# Patient Record
Sex: FEMALE | Race: BLACK OR AFRICAN AMERICAN | NOT HISPANIC OR LATINO | Employment: UNEMPLOYED | URBAN - METROPOLITAN AREA
[De-identification: names, ages, dates, MRNs, and addresses within clinical notes are randomized per-mention and may not be internally consistent; named-entity substitution may affect disease eponyms.]

---
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
## 2020-04-07 DIAGNOSIS — N92.0 MENORRHAGIA WITH REGULAR CYCLE: ICD-10-CM

## 2020-04-07 DIAGNOSIS — N94.6 DYSMENORRHEA: ICD-10-CM

## 2020-04-07 RX ORDER — NORETHINDRONE ACETATE AND ETHINYL ESTRADIOL AND FERROUS FUMARATE 1MG-20(21)
KIT ORAL
Qty: 84 TABLET | Refills: 1 | Status: SHIPPED | OUTPATIENT
Start: 2020-04-07 | End: 2020-09-29

## 2020-09-21 DIAGNOSIS — N92.0 MENORRHAGIA WITH REGULAR CYCLE: ICD-10-CM

## 2020-09-21 DIAGNOSIS — N94.6 DYSMENORRHEA: ICD-10-CM

## 2020-09-30 RX ORDER — NORETHINDRONE ACETATE AND ETHINYL ESTRADIOL AND FERROUS FUMARATE 1MG-20(21)
KIT ORAL
Qty: 84 TABLET | Refills: 0 | Status: SHIPPED | OUTPATIENT
Start: 2020-09-30

## 2023-12-31 ENCOUNTER — HOSPITAL ENCOUNTER (INPATIENT)
Facility: HOSPITAL | Age: 23
LOS: 8 days | Discharge: HOME | End: 2024-01-08
Attending: EMERGENCY MEDICINE | Admitting: STUDENT IN AN ORGANIZED HEALTH CARE EDUCATION/TRAINING PROGRAM
Payer: COMMERCIAL

## 2023-12-31 DIAGNOSIS — Z3A.31 31 WEEKS GESTATION OF PREGNANCY: ICD-10-CM

## 2023-12-31 DIAGNOSIS — O14.90 PREECLAMPSIA: Primary | ICD-10-CM

## 2023-12-31 DIAGNOSIS — O14.13 PREECLAMPSIA, SEVERE, THIRD TRIMESTER: ICD-10-CM

## 2023-12-31 PROBLEM — N94.6 DYSMENORRHEA: Status: RESOLVED | Noted: 2019-05-02 | Resolved: 2023-12-31

## 2023-12-31 LAB
ABO GROUP BLD: NORMAL
ABO GROUP BLD: NORMAL
ALBUMIN SERPL BCP-MCNC: 3.5 G/DL (ref 3.5–5)
ALP SERPL-CCNC: 231 U/L (ref 34–104)
ALT SERPL W P-5'-P-CCNC: 14 U/L (ref 7–52)
ANION GAP SERPL CALCULATED.3IONS-SCNC: 9 MMOL/L
APTT PPP: 29 SECONDS (ref 23–37)
AST SERPL W P-5'-P-CCNC: 20 U/L (ref 13–39)
BACTERIA UR QL AUTO: ABNORMAL /HPF
BASOPHILS # BLD AUTO: 0.03 THOUSANDS/ÂΜL (ref 0–0.1)
BASOPHILS NFR BLD AUTO: 0 % (ref 0–1)
BILIRUB SERPL-MCNC: 0.22 MG/DL (ref 0.2–1)
BILIRUB UR QL STRIP: NEGATIVE
BLD GP AB SCN SERPL QL: NEGATIVE
BUN SERPL-MCNC: 7 MG/DL (ref 5–25)
CALCIUM SERPL-MCNC: 8.7 MG/DL (ref 8.4–10.2)
CHLORIDE SERPL-SCNC: 108 MMOL/L (ref 96–108)
CLARITY UR: CLEAR
CO2 SERPL-SCNC: 20 MMOL/L (ref 21–32)
COLOR UR: ABNORMAL
CREAT SERPL-MCNC: 0.8 MG/DL (ref 0.6–1.3)
CREAT UR-MCNC: 60 MG/DL
EOSINOPHIL # BLD AUTO: 0.12 THOUSAND/ÂΜL (ref 0–0.61)
EOSINOPHIL NFR BLD AUTO: 2 % (ref 0–6)
ERYTHROCYTE [DISTWIDTH] IN BLOOD BY AUTOMATED COUNT: 14.9 % (ref 11.6–15.1)
GFR SERPL CREATININE-BSD FRML MDRD: 104 ML/MIN/1.73SQ M
GLUCOSE SERPL-MCNC: 71 MG/DL (ref 65–140)
GLUCOSE UR STRIP-MCNC: NEGATIVE MG/DL
HCT VFR BLD AUTO: 33.1 % (ref 34.8–46.1)
HGB BLD-MCNC: 10.5 G/DL (ref 11.5–15.4)
HGB UR QL STRIP.AUTO: NEGATIVE
IMM GRANULOCYTES # BLD AUTO: 0.03 THOUSAND/UL (ref 0–0.2)
IMM GRANULOCYTES NFR BLD AUTO: 0 % (ref 0–2)
INR PPP: 0.95 (ref 0.84–1.19)
KETONES UR STRIP-MCNC: NEGATIVE MG/DL
LEUKOCYTE ESTERASE UR QL STRIP: NEGATIVE
LYMPHOCYTES # BLD AUTO: 1.46 THOUSANDS/ÂΜL (ref 0.6–4.47)
LYMPHOCYTES NFR BLD AUTO: 22 % (ref 14–44)
MAGNESIUM SERPL-MCNC: 1.8 MG/DL (ref 1.9–2.7)
MCH RBC QN AUTO: 26.9 PG (ref 26.8–34.3)
MCHC RBC AUTO-ENTMCNC: 31.7 G/DL (ref 31.4–37.4)
MCV RBC AUTO: 85 FL (ref 82–98)
MONOCYTES # BLD AUTO: 1.15 THOUSAND/ÂΜL (ref 0.17–1.22)
MONOCYTES NFR BLD AUTO: 17 % (ref 4–12)
NEUTROPHILS # BLD AUTO: 3.94 THOUSANDS/ÂΜL (ref 1.85–7.62)
NEUTS SEG NFR BLD AUTO: 59 % (ref 43–75)
NITRITE UR QL STRIP: NEGATIVE
NON-SQ EPI CELLS URNS QL MICRO: ABNORMAL /HPF
NRBC BLD AUTO-RTO: 0 /100 WBCS
PH UR STRIP.AUTO: 7 [PH]
PLATELET # BLD AUTO: 206 THOUSANDS/UL (ref 149–390)
PMV BLD AUTO: 11.5 FL (ref 8.9–12.7)
POTASSIUM SERPL-SCNC: 3.8 MMOL/L (ref 3.5–5.3)
PROT SERPL-MCNC: 6.3 G/DL (ref 6.4–8.4)
PROT UR STRIP-MCNC: ABNORMAL MG/DL
PROT UR-MCNC: 24 MG/DL
PROT/CREAT UR: 0.4 MG/G{CREAT} (ref 0–0.1)
PROTHROMBIN TIME: 13.3 SECONDS (ref 11.6–14.5)
RBC # BLD AUTO: 3.9 MILLION/UL (ref 3.81–5.12)
RBC #/AREA URNS AUTO: ABNORMAL /HPF
RH BLD: POSITIVE
RH BLD: POSITIVE
RUBV IGG SERPL IA-ACNC: 22.3 IU/ML
SODIUM SERPL-SCNC: 137 MMOL/L (ref 135–147)
SP GR UR STRIP.AUTO: 1.01 (ref 1–1.03)
SPECIMEN EXPIRATION DATE: NORMAL
UROBILINOGEN UR STRIP-ACNC: <2 MG/DL
WBC # BLD AUTO: 6.73 THOUSAND/UL (ref 4.31–10.16)
WBC #/AREA URNS AUTO: ABNORMAL /HPF

## 2023-12-31 PROCEDURE — 96375 TX/PRO/DX INJ NEW DRUG ADDON: CPT

## 2023-12-31 PROCEDURE — 84156 ASSAY OF PROTEIN URINE: CPT

## 2023-12-31 PROCEDURE — 87389 HIV-1 AG W/HIV-1&-2 AB AG IA: CPT | Performed by: OBSTETRICS & GYNECOLOGY

## 2023-12-31 PROCEDURE — 36415 COLL VENOUS BLD VENIPUNCTURE: CPT

## 2023-12-31 PROCEDURE — 80053 COMPREHEN METABOLIC PANEL: CPT

## 2023-12-31 PROCEDURE — 86780 TREPONEMA PALLIDUM: CPT | Performed by: OBSTETRICS & GYNECOLOGY

## 2023-12-31 PROCEDURE — 96374 THER/PROPH/DIAG INJ IV PUSH: CPT

## 2023-12-31 PROCEDURE — 81001 URINALYSIS AUTO W/SCOPE: CPT

## 2023-12-31 PROCEDURE — 86850 RBC ANTIBODY SCREEN: CPT | Performed by: OBSTETRICS & GYNECOLOGY

## 2023-12-31 PROCEDURE — 86762 RUBELLA ANTIBODY: CPT | Performed by: OBSTETRICS & GYNECOLOGY

## 2023-12-31 PROCEDURE — NC001 PR NO CHARGE: Performed by: STUDENT IN AN ORGANIZED HEALTH CARE EDUCATION/TRAINING PROGRAM

## 2023-12-31 PROCEDURE — 86901 BLOOD TYPING SEROLOGIC RH(D): CPT | Performed by: OBSTETRICS & GYNECOLOGY

## 2023-12-31 PROCEDURE — 99213 OFFICE O/P EST LOW 20 MIN: CPT

## 2023-12-31 PROCEDURE — 85610 PROTHROMBIN TIME: CPT

## 2023-12-31 PROCEDURE — 85730 THROMBOPLASTIN TIME PARTIAL: CPT

## 2023-12-31 PROCEDURE — 87150 DNA/RNA AMPLIFIED PROBE: CPT | Performed by: OBSTETRICS & GYNECOLOGY

## 2023-12-31 PROCEDURE — 86706 HEP B SURFACE ANTIBODY: CPT | Performed by: OBSTETRICS & GYNECOLOGY

## 2023-12-31 PROCEDURE — 86900 BLOOD TYPING SEROLOGIC ABO: CPT | Performed by: OBSTETRICS & GYNECOLOGY

## 2023-12-31 PROCEDURE — 83735 ASSAY OF MAGNESIUM: CPT

## 2023-12-31 PROCEDURE — 87086 URINE CULTURE/COLONY COUNT: CPT

## 2023-12-31 PROCEDURE — 82570 ASSAY OF URINE CREATININE: CPT

## 2023-12-31 PROCEDURE — 85025 COMPLETE CBC W/AUTO DIFF WBC: CPT

## 2023-12-31 PROCEDURE — 87340 HEPATITIS B SURFACE AG IA: CPT | Performed by: OBSTETRICS & GYNECOLOGY

## 2023-12-31 PROCEDURE — 99291 CRITICAL CARE FIRST HOUR: CPT | Performed by: EMERGENCY MEDICINE

## 2023-12-31 PROCEDURE — G0463 HOSPITAL OUTPT CLINIC VISIT: HCPCS

## 2023-12-31 PROCEDURE — 99284 EMERGENCY DEPT VISIT MOD MDM: CPT

## 2023-12-31 PROCEDURE — NC001 PR NO CHARGE: Performed by: OBSTETRICS & GYNECOLOGY

## 2023-12-31 RX ORDER — ACETAMINOPHEN 325 MG/1
650 TABLET ORAL EVERY 4 HOURS PRN
Status: DISCONTINUED | OUTPATIENT
Start: 2023-12-31 | End: 2024-01-05

## 2023-12-31 RX ORDER — MAGNESIUM SULFATE HEPTAHYDRATE 40 MG/ML
4 INJECTION, SOLUTION INTRAVENOUS ONCE
Status: COMPLETED | OUTPATIENT
Start: 2023-12-31 | End: 2023-12-31

## 2023-12-31 RX ORDER — LABETALOL HYDROCHLORIDE 5 MG/ML
20 INJECTION, SOLUTION INTRAVENOUS ONCE
Status: COMPLETED | OUTPATIENT
Start: 2023-12-31 | End: 2023-12-31

## 2023-12-31 RX ORDER — DOCUSATE SODIUM 100 MG/1
100 CAPSULE, LIQUID FILLED ORAL 2 TIMES DAILY PRN
Status: DISCONTINUED | OUTPATIENT
Start: 2023-12-31 | End: 2024-01-06

## 2023-12-31 RX ORDER — LABETALOL HYDROCHLORIDE 5 MG/ML
40 INJECTION, SOLUTION INTRAVENOUS ONCE
Status: DISCONTINUED | OUTPATIENT
Start: 2023-12-31 | End: 2024-01-04

## 2023-12-31 RX ORDER — MAGNESIUM SULFATE HEPTAHYDRATE 40 MG/ML
2 INJECTION, SOLUTION INTRAVENOUS CONTINUOUS
Status: DISCONTINUED | OUTPATIENT
Start: 2023-12-31 | End: 2024-01-01

## 2023-12-31 RX ORDER — CALCIUM CARBONATE 500 MG/1
1000 TABLET, CHEWABLE ORAL DAILY PRN
Status: DISCONTINUED | OUTPATIENT
Start: 2023-12-31 | End: 2024-01-06

## 2023-12-31 RX ORDER — NIFEDIPINE 30 MG/1
30 TABLET, EXTENDED RELEASE ORAL DAILY
Status: DISCONTINUED | OUTPATIENT
Start: 2024-01-01 | End: 2024-01-04

## 2023-12-31 RX ORDER — BETAMETHASONE SODIUM PHOSPHATE AND BETAMETHASONE ACETATE 3; 3 MG/ML; MG/ML
12 INJECTION, SUSPENSION INTRA-ARTICULAR; INTRALESIONAL; INTRAMUSCULAR; SOFT TISSUE EVERY 24 HOURS
Status: COMPLETED | OUTPATIENT
Start: 2023-12-31 | End: 2024-01-01

## 2023-12-31 RX ORDER — SIMETHICONE 80 MG
80 TABLET,CHEWABLE ORAL 4 TIMES DAILY PRN
Status: DISCONTINUED | OUTPATIENT
Start: 2023-12-31 | End: 2024-01-06

## 2023-12-31 RX ORDER — MAGNESIUM SULFATE HEPTAHYDRATE 40 MG/ML
2 INJECTION, SOLUTION INTRAVENOUS ONCE
Status: COMPLETED | OUTPATIENT
Start: 2023-12-31 | End: 2023-12-31

## 2023-12-31 RX ORDER — ONDANSETRON 2 MG/ML
4 INJECTION INTRAMUSCULAR; INTRAVENOUS EVERY 8 HOURS PRN
Status: DISCONTINUED | OUTPATIENT
Start: 2023-12-31 | End: 2024-01-06

## 2023-12-31 RX ADMIN — MAGNESIUM SULFATE HEPTAHYDRATE 2 G: 40 INJECTION, SOLUTION INTRAVENOUS at 18:12

## 2023-12-31 RX ADMIN — MAGNESIUM SULFATE IN WATER 2 G/HR: 20 INJECTION, SOLUTION INTRAVENOUS at 21:37

## 2023-12-31 RX ADMIN — MAGNESIUM SULFATE 4 G: 4 INJECTION INTRAVENOUS at 21:05

## 2023-12-31 RX ADMIN — BETAMETHASONE SODIUM PHOSPHATE AND BETAMETHASONE ACETATE 12 MG: 3; 3 INJECTION, SUSPENSION INTRA-ARTICULAR; INTRALESIONAL; INTRAMUSCULAR at 20:36

## 2023-12-31 RX ADMIN — Medication 20 MG: at 19:47

## 2023-12-31 NOTE — ED PROVIDER NOTES
History  Chief Complaint   Patient presents with    Hypertension - Pregnant     Patient is approx 7 months pregnant. Reports noting elevated BP readings today (140/90's) w/ accompanying HA. Hx preeclampsia with prior pregnancies.      Patient is a 24 yo female  at about 30 weeks with history of preeclampsia in previous pregnancies presenting for elevated blood pressure and headache.    Patient reports that she woke up with headache this morning described as 6/10, unchanged despite drinking water.  Denies vision changes, lightheadedness, dizziness, focal neurologic deficits.  Patient at home due to her history of preeclampsia in previous pregnancies started taking her blood pressure and noticed that she had multiple pressures over 140/90 and concerned she presented to the ED.  Denies abdominal pain, chest pain, palpitations, shortness of breath, lower extremity edema, back pain. Has felt good fetal movement, no contractions, no loss of fluids, no bleeding. Also denies recent illness, fevers, chills, nausea, vomiting, dysuria, frequency, diarrhea.        Prior to Admission Medications   Prescriptions Last Dose Informant Patient Reported? Taking?   Junel FE 1/20 1-20 MG-MCG per tablet Not Taking  No No   Sig: TAKE 1 TABLET BY MOUTH EVERY DAY   Patient not taking: Reported on 2023   albuterol (PROVENTIL HFA,VENTOLIN HFA) 90 mcg/act inhaler Unknown  Yes No   Sig: Inhale 2 puffs every 6 (six) hours as needed for wheezing.   fluticasone (FLOVENT DISKUS) 50 MCG/BLIST diskus inhaler Unknown  Yes No   Sig: Inhale 1 puff 2 (two) times a day.   loratadine (CLARITIN) 10 mg tablet Unknown  Yes No   Sig: Take 10 mg by mouth as needed        Facility-Administered Medications: None       Past Medical History:   Diagnosis Date    Asthma     Dysmenorrhea     Iron deficiency anemia     Menorrhagia     Migraine        History reviewed. No pertinent surgical history.    Family History   Problem Relation Age of Onset    Diabetes  Mother     Hypertension Mother      I have reviewed and agree with the history as documented.    E-Cigarette/Vaping    E-Cigarette Use Never User      E-Cigarette/Vaping Substances    Nicotine No     THC No     CBD No     Flavoring No     Other No     Unknown No      Social History     Tobacco Use    Smoking status: Never    Smokeless tobacco: Never   Vaping Use    Vaping status: Never Used   Substance Use Topics    Alcohol use: No    Drug use: No        Review of Systems   Constitutional:  Negative for chills and fever.   Eyes:  Negative for photophobia and visual disturbance.   Respiratory:  Negative for chest tightness and shortness of breath.    Cardiovascular:  Negative for chest pain and palpitations.   Gastrointestinal:  Negative for abdominal pain.   Genitourinary:  Negative for dysuria, vaginal bleeding and vaginal discharge.   Neurological:  Positive for headaches. Negative for dizziness, seizures, facial asymmetry, speech difficulty, weakness, light-headedness and numbness.       Physical Exam  ED Triage Vitals [12/31/23 1706]   Temperature Pulse Respirations Blood Pressure SpO2   98.2 °F (36.8 °C) 76 16 (!) 172/94 99 %      Temp Source Heart Rate Source Patient Position - Orthostatic VS BP Location FiO2 (%)   Oral Monitor Sitting Right arm --      Pain Score       6             Orthostatic Vital Signs  Vitals:    12/31/23 2119 12/31/23 2130 12/31/23 2139 12/31/23 2149   BP: 132/90 142/94 125/93 132/92   Pulse: 78 79 78 80   Patient Position - Orthostatic VS:           Physical Exam  Vitals and nursing note reviewed.   Constitutional:       General: She is not in acute distress.     Appearance: She is well-developed.   HENT:      Head: Normocephalic and atraumatic.      Mouth/Throat:      Mouth: Mucous membranes are moist.      Pharynx: Oropharynx is clear.   Eyes:      Extraocular Movements: Extraocular movements intact.      Conjunctiva/sclera: Conjunctivae normal.      Pupils: Pupils are equal, round,  and reactive to light.   Cardiovascular:      Rate and Rhythm: Normal rate and regular rhythm.      Pulses: Normal pulses.      Heart sounds: Normal heart sounds. No murmur heard.  Pulmonary:      Effort: Pulmonary effort is normal. No respiratory distress.      Breath sounds: Normal breath sounds. No wheezing, rhonchi or rales.   Abdominal:      General: Abdomen is flat.      Palpations: Abdomen is soft.      Tenderness: There is no abdominal tenderness. There is no right CVA tenderness or left CVA tenderness.      Comments: Gravid   Musculoskeletal:      Cervical back: Normal range of motion.      Right lower leg: No edema.      Left lower leg: No edema.   Skin:     General: Skin is warm and dry.      Capillary Refill: Capillary refill takes less than 2 seconds.   Neurological:      General: No focal deficit present.      Mental Status: She is alert and oriented to person, place, and time.      Cranial Nerves: No cranial nerve deficit.      Sensory: No sensory deficit.      Motor: No weakness.      Coordination: Coordination normal.      Gait: Gait normal.   Psychiatric:         Mood and Affect: Mood normal.         Behavior: Behavior normal.         ED Medications  Medications   acetaminophen (TYLENOL) tablet 650 mg (has no administration in time range)   docusate sodium (COLACE) capsule 100 mg (has no administration in time range)   ondansetron (ZOFRAN) injection 4 mg (has no administration in time range)   calcium carbonate (TUMS) chewable tablet 1,000 mg (has no administration in time range)   simethicone (MYLICON) chewable tablet 80 mg (has no administration in time range)   betamethasone acetate-betamethasone sodium phosphate (CELESTONE) injection 12 mg (12 mg Intramuscular Given 12/31/23 2036)   NIFEdipine (PROCARDIA XL) 24 hr tablet 30 mg (has no administration in time range)   magnesium sulfate 20 g/500 mL infusion (premix) (2 g/hr Intravenous New Bag 12/31/23 2137)   tetanus-diphtheria-acellular  pertussis (BOOSTRIX) IM injection 0.5 mL (has no administration in time range)   labetalol (NORMODYNE) injection 40 mg (0 mg Intravenous Hold 12/31/23 2136)   magnesium sulfate 4 g/100 mL IVPB (premix) 4 g (0 g Intravenous Stopped 12/31/23 2130)     Followed by   magnesium sulfate 2 g/50 mL IVPB (premix) 2 g (0 g Intravenous Stopped 12/31/23 1822)   labetalol (NORMODYNE) injection 20 mg (20 mg Intravenous Given 12/31/23 1947)       Diagnostic Studies  Results Reviewed       Procedure Component Value Units Date/Time    Comprehensive metabolic panel [[id number]]  (Abnormal) Collected: 12/31/23 1811    Lab Status: Final result Specimen: Blood from Arm, Left Updated: 12/31/23 1840     Sodium 137 mmol/L      Potassium 3.8 mmol/L      Chloride 108 mmol/L      CO2 20 mmol/L      ANION GAP 9 mmol/L      BUN 7 mg/dL      Creatinine 0.80 mg/dL      Glucose 71 mg/dL      Calcium 8.7 mg/dL      AST 20 U/L      ALT 14 U/L      Alkaline Phosphatase 231 U/L      Total Protein 6.3 g/dL      Albumin 3.5 g/dL      Total Bilirubin 0.22 mg/dL      eGFR 104 ml/min/1.73sq m     Narrative:      National Kid[employer] Foundation guidelines for Chronic Kidney Disease (CKD):     Stage 1 with normal or high GFR (GFR > 90 mL/min/1.73 square meters)    Stage 2 Mild CKD (GFR = 60-89 mL/min/1.73 square meters)    Stage 3A Moderate CKD (GFR = 45-59 mL/min/1.73 square meters)    Stage 3B Moderate CKD (GFR = 30-44 mL/min/1.73 square meters)    Stage 4 Severe CKD (GFR = 15-29 mL/min/1.73 square meters)    Stage 5 End Stage CKD (GFR <15 mL/min/1.73 square meters)  Note: GFR calculation is accurate only with a steady state creatinine    Magnesium [[id number]]  (Abnormal) Collected: 12/31/23 1811    Lab Status: Final result Specimen: Blood from Arm, Left Updated: 12/31/23 1840     Magnesium 1.8 mg/dL     Protein / creatinine ratio, urine [[id number]]  (Abnormal) Collected: 12/31/23 1817    Lab Status: Final result Specimen: Urine, Clean Catch Updated:  12/31/23 1836     Creatinine, Ur 60.0 mg/dL      Protein Urine Random 24 mg/dL      Prot/Creat Ratio, Ur 0.40    Protime-INR [[id number]]  (Normal) Collected: 12/31/23 1811    Lab Status: Final result Specimen: Blood from Arm, Left Updated: 12/31/23 1835     Protime 13.3 seconds      INR 0.95    APTT [[id number]]  (Normal) Collected: 12/31/23 1811    Lab Status: Final result Specimen: Blood from Arm, Left Updated: 12/31/23 1835     PTT 29 seconds     CBC and differential [[id number]]  (Abnormal) Collected: 12/31/23 1811    Lab Status: Final result Specimen: Blood from Arm, Left Updated: 12/31/23 1824     WBC 6.73 Thousand/uL      RBC 3.90 Million/uL      Hemoglobin 10.5 g/dL      Hematocrit 33.1 %      MCV 85 fL      MCH 26.9 pg      MCHC 31.7 g/dL      RDW 14.9 %      MPV 11.5 fL      Platelets 206 Thousands/uL      nRBC 0 /100 WBCs      Neutrophils Relative 59 %      Immat GRANS % 0 %      Lymphocytes Relative 22 %      Monocytes Relative 17 %      Eosinophils Relative 2 %      Basophils Relative 0 %      Neutrophils Absolute 3.94 Thousands/µL      Immature Grans Absolute 0.03 Thousand/uL      Lymphocytes Absolute 1.46 Thousands/µL      Monocytes Absolute 1.15 Thousand/µL      Eosinophils Absolute 0.12 Thousand/µL      Basophils Absolute 0.03 Thousands/µL     Urine Microscopic [[id number]]  (Abnormal) Collected: 12/31/23 1803    Lab Status: Final result Specimen: Urine, Clean Catch Updated: 12/31/23 1824     RBC, UA None Seen /hpf      WBC, UA 1-2 /hpf      Epithelial Cells Occasional /hpf      Bacteria, UA Innumerable /hpf      URINE COMMENT --    UA w Reflex to Microscopic w Reflex to Culture [[id number]]  (Abnormal) Collected: 12/31/23 1803    Lab Status: Final result Specimen: Urine, Clean Catch Updated: 12/31/23 1821     Color, UA Light Yellow     Clarity, UA Clear     Specific Gravity, UA 1.009     pH, UA 7.0     Leukocytes, UA Negative     Nitrite, UA Negative     Protein, UA Trace mg/dl      Glucose, UA  Negative mg/dl      Ketones, UA Negative mg/dl      Urobilinogen, UA <2.0 mg/dl      Bilirubin, UA Negative     Occult Blood, UA Negative     URINE COMMENT --    Urine culture [[id number]] Collected: 23    Lab Status: In process Specimen: Urine, Clean Catch Updated: 23                   No orders to display         Procedures  Procedures      ED Course  ED Course as of 23 2234   Sun Dec 31, 2023   1707 Blood Pressure(!): 172/94  172/94, HR 76, RR 16, 98.2 F, SpO2 99% RA                             SBIRT 20yo+      Flowsheet Row Most Recent Value   Initial Alcohol Screen: US AUDIT-C     1. How often do you have a drink containing alcohol? 0 Filed at: 2023   2. How many drinks containing alcohol do you have on a typical day you are drinking?  0 Filed at: 2023   3a. Male UNDER 65: How often do you have five or more drinks on one occasion? 0 Filed at: 2023   3b. FEMALE Any Age, or MALE 65+: How often do you have 4 or more drinks on one occassion? 0 Filed at: 2023   Audit-C Score 0 Filed at: 2023   JANKI: How many times in the past year have you...    Used an illegal drug or used a prescription medication for non-medical reasons? Never Filed at: 2023                  Medical Decision Making  22 yo female  at about 30 weeks with history of preeclampsia in previous pregnancies presenting for pre-eclampsia, with pressures in the 170s/90-100s in the ED, and headache. No focal neurologic deficits, no abdominal pain, no lower extremity edema. Normal fetal movement, no contractions, no large rushes of fluid, no vaginal bleeding. Pre-eclampsia labs sent including CBC, CMP, Urine Pr:Cr, UA. Ordered IV magnesium. As patient would be admitted to their service and they have the nursing who would more appropriately be able to monitor blood pressure and status on magnesium, reached out to OBGYN. Discussed patient's case with Dr. Burt  (OBGYN PGY4) regarding admission who accepted the patient for further evaluation and management under Dr. Urbano.    Amount and/or Complexity of Data Reviewed  Labs: ordered.    Risk  Prescription drug management.          Disposition  Final diagnoses:   Preeclampsia     Time reflects when diagnosis was documented in both MDM as applicable and the Disposition within this note       Time User Action Codes Description Comment    12/31/2023  7:45 PM KimErica Add [O14.13] Preeclampsia, severe, third trimester     12/31/2023  8:10 PM Asia Burt Add [Z3A.31] 31 weeks gestation of pregnancy     12/31/2023  9:25 PM Mary Kay Piña Add [O14.90] Preeclampsia     12/31/2023  9:25 PM Mary Kay Piña Modify [O14.13] Preeclampsia, severe, third trimester     12/31/2023  9:25 PM Mary Kay Piña Modify [O14.90] Preeclampsia           ED Disposition       ED Disposition   Send to L&D After Provider Eval    Condition   --    Date/Time   Sun Dec 31, 2023 1806    Comment   --             Follow-up Information    None         Current Discharge Medication List        CONTINUE these medications which have NOT CHANGED    Details   albuterol (PROVENTIL HFA,VENTOLIN HFA) 90 mcg/act inhaler Inhale 2 puffs every 6 (six) hours as needed for wheezing.      fluticasone (FLOVENT DISKUS) 50 MCG/BLIST diskus inhaler Inhale 1 puff 2 (two) times a day.      Junel FE 1/20 1-20 MG-MCG per tablet TAKE 1 TABLET BY MOUTH EVERY DAY  Qty: 84 tablet, Refills: 0    Associated Diagnoses: Menorrhagia with regular cycle; Dysmenorrhea      loratadine (CLARITIN) 10 mg tablet Take 10 mg by mouth as needed             No discharge procedures on file.    PDMP Review       None             ED Provider  Attending physically available and evaluated Monisha Liu. I managed the patient along with the ED Attending.    Electronically Signed by           Mary Kay Piña MD  12/31/23 9728

## 2023-12-31 NOTE — H&P
H & P- Obstetrics   Monisha Liu 23 y.o. female MRN: [id number]  Unit/Bed#: LD TRIAGE  Encounter: [id number]      Assessment/Plan:    Monisha marshall a 23 y.o.  at 31w3d admitted for preecclampsia with severe features based on sustained SRBPs requiring IV antihypertensive medication with urine P/C 0.4.    31 weeks gestation of pregnancy  Assessment & Plan  -Admit to antepartum  -Vertex by TAUS  -GBS collected  -Continuous fetal monitoring while on magnesium  -Plan for formal US with perinatology    * Preeclampsia, severe, third trimester  Assessment & Plan  -: SRBPs treated with 20 mg IV labetalol, CBC wnl, CMP with Cr 0.8, AST and ALT wnl, P/C 0.4  -Mag infusion started  -F/u admission labs  -Perinatology consult, neonatology consult  -Betamethasone for fetal lung protection       Patient of: [facility] default  This patient will be an INPATIENT  and I certify the anticipated length of stay is >2 Midnights  Discussed with Dr. Burt and Dr. Govea      SUBJECTIVE:    Chief Complaint: Headache, high blood pressure    HPI: Monisha Liu is a 23 y.o.  with an DULCE of 2024, at 31w3d who is being admitted for preeclampsia with severe features.  She has a history of preeclampsia in her prior pregnancy for which she delivered at 38 weeks.  She reports that she was discharged postpartum with a blood pressure medication that she took for 2 to 4 weeks and then was taken off the blood pressure medication at that time.  She denies history of chronic hypertension, any high blood pressures outside of pregnancy, any oral antihypertensive medications now.  Recently she has had some mild range blood pressures for which she has been monitored, and she has been on aspirin throughout this pregnancy.  Today she reports headache that is improving and otherwise feels well.  She denies contractions, loss of fluid, vaginal bleeding, and reports good fetal movement.  She is living in [place] and is in town visiting family  for the holidays.    Pregnancy Plan:  Pregnancy: Mejia  Fetal sex: Male     Delivery Plans  Planned delivery method: Vaginal      Patient Active Problem List   Diagnosis    Plantar fasciitis    Enthesopathy of lower extremity    Nipple discharge    Pain of left breast    Thigh laceration    Menorrhagia with regular cycle    Preeclampsia, severe, third trimester    31 weeks gestation of pregnancy       OB History    Para Term  AB Living   1 0 0 0 0 0   SAB IAB Ectopic Multiple Live Births   0 0 0 0 0      # Outcome Date GA Lbr Jethro/2nd Weight Sex Delivery Anes PTL Lv   1 Current                Past Medical History:   Diagnosis Date    Asthma     Dysmenorrhea     Iron deficiency anemia     Menorrhagia     Migraine        History reviewed. No pertinent surgical history.    Social History     Tobacco Use    Smoking status: Never    Smokeless tobacco: Never   Substance Use Topics    Alcohol use: No       No Known Allergies    Medications Prior to Admission   Medication    albuterol (PROVENTIL HFA,VENTOLIN HFA) 90 mcg/act inhaler    fluticasone (FLOVENT DISKUS) 50 MCG/BLIST diskus inhaler    Junel FE 1/20 1-20 MG-MCG per tablet    loratadine (CLARITIN) 10 mg tablet           OBJECTIVE:  Vitals:  Temp:  [98.2 °F (36.8 °C)] 98.2 °F (36.8 °C)  HR:  [57-76] 71  Resp:  [16-18] 18  BP: (141-177)/() 151/86  Body mass index is 30.04 kg/m².     Physical Exam:  General: Well appearing, no distress  Respiratory: Unlabored breathing  Cardiovascular: Regular rate.  Abdomen: Soft, gravid, nontender  Fundal Height: Appropriate for gestational age.  Extremities: Warm and well perfused.  Non tender.  Psychiatric: Behavioral normal        FHT:  Baseline 125, moderate variability, 15x15 accelerations present, absent decelerations    TOCO:   Contractions irregular      Prenatal Labs: None recent      Erica Alvarez MD  2023  8:33 PM        Portions of the record may have been created with voice recognition  "software.  Occasional wrong word or \"sound a like\" substitutions may have occurred due to the inherent limitations of voice recognition software.  Read the chart carefully and recognize, using context, where substitutions have occurred    "

## 2024-01-01 LAB
ALBUMIN SERPL BCP-MCNC: 3.5 G/DL (ref 3.5–5)
ALBUMIN SERPL BCP-MCNC: 3.9 G/DL (ref 3.5–5)
ALP SERPL-CCNC: 230 U/L (ref 34–104)
ALP SERPL-CCNC: 259 U/L (ref 34–104)
ALT SERPL W P-5'-P-CCNC: 17 U/L (ref 7–52)
ALT SERPL W P-5'-P-CCNC: 20 U/L (ref 7–52)
ANION GAP SERPL CALCULATED.3IONS-SCNC: 11 MMOL/L
ANION GAP SERPL CALCULATED.3IONS-SCNC: 11 MMOL/L
AST SERPL W P-5'-P-CCNC: 21 U/L (ref 13–39)
AST SERPL W P-5'-P-CCNC: 25 U/L (ref 13–39)
BASOPHILS # BLD AUTO: 0.02 THOUSANDS/ÂΜL (ref 0–0.1)
BASOPHILS NFR BLD AUTO: 0 % (ref 0–1)
BILIRUB SERPL-MCNC: 0.17 MG/DL (ref 0.2–1)
BILIRUB SERPL-MCNC: 0.19 MG/DL (ref 0.2–1)
BUN SERPL-MCNC: 6 MG/DL (ref 5–25)
BUN SERPL-MCNC: 7 MG/DL (ref 5–25)
CALCIUM SERPL-MCNC: 7.2 MG/DL (ref 8.4–10.2)
CALCIUM SERPL-MCNC: 7.8 MG/DL (ref 8.4–10.2)
CHLORIDE SERPL-SCNC: 104 MMOL/L (ref 96–108)
CHLORIDE SERPL-SCNC: 106 MMOL/L (ref 96–108)
CO2 SERPL-SCNC: 18 MMOL/L (ref 21–32)
CO2 SERPL-SCNC: 18 MMOL/L (ref 21–32)
CREAT SERPL-MCNC: 0.79 MG/DL (ref 0.6–1.3)
CREAT SERPL-MCNC: 0.86 MG/DL (ref 0.6–1.3)
EOSINOPHIL # BLD AUTO: 0.01 THOUSAND/ÂΜL (ref 0–0.61)
EOSINOPHIL NFR BLD AUTO: 0 % (ref 0–6)
ERYTHROCYTE [DISTWIDTH] IN BLOOD BY AUTOMATED COUNT: 15.1 % (ref 11.6–15.1)
ERYTHROCYTE [DISTWIDTH] IN BLOOD BY AUTOMATED COUNT: 15.4 % (ref 11.6–15.1)
GFR SERPL CREATININE-BSD FRML MDRD: 105 ML/MIN/1.73SQ M
GFR SERPL CREATININE-BSD FRML MDRD: 95 ML/MIN/1.73SQ M
GLUCOSE SERPL-MCNC: 128 MG/DL (ref 65–140)
GLUCOSE SERPL-MCNC: 160 MG/DL (ref 65–140)
HBV SURFACE AB SER-ACNC: <3 MIU/ML
HBV SURFACE AG SER QL: NORMAL
HCT VFR BLD AUTO: 33.5 % (ref 34.8–46.1)
HCT VFR BLD AUTO: 36.8 % (ref 34.8–46.1)
HGB BLD-MCNC: 10.6 G/DL (ref 11.5–15.4)
HGB BLD-MCNC: 11.5 G/DL (ref 11.5–15.4)
HIV 1+2 AB+HIV1 P24 AG SERPL QL IA: NORMAL
HIV 2 AB SERPL QL IA: NORMAL
HIV1 AB SERPL QL IA: NORMAL
HIV1 P24 AG SERPL QL IA: NORMAL
IMM GRANULOCYTES # BLD AUTO: 0.15 THOUSAND/UL (ref 0–0.2)
IMM GRANULOCYTES NFR BLD AUTO: 2 % (ref 0–2)
LYMPHOCYTES # BLD AUTO: 1.41 THOUSANDS/ÂΜL (ref 0.6–4.47)
LYMPHOCYTES NFR BLD AUTO: 17 % (ref 14–44)
MAGNESIUM SERPL-MCNC: 6 MG/DL (ref 1.9–2.7)
MAGNESIUM SERPL-MCNC: 6.3 MG/DL (ref 1.9–2.7)
MCH RBC QN AUTO: 26.6 PG (ref 26.8–34.3)
MCH RBC QN AUTO: 27 PG (ref 26.8–34.3)
MCHC RBC AUTO-ENTMCNC: 31.3 G/DL (ref 31.4–37.4)
MCHC RBC AUTO-ENTMCNC: 31.6 G/DL (ref 31.4–37.4)
MCV RBC AUTO: 85 FL (ref 82–98)
MCV RBC AUTO: 86 FL (ref 82–98)
MONOCYTES # BLD AUTO: 0.67 THOUSAND/ÂΜL (ref 0.17–1.22)
MONOCYTES NFR BLD AUTO: 8 % (ref 4–12)
NEUTROPHILS # BLD AUTO: 6.2 THOUSANDS/ÂΜL (ref 1.85–7.62)
NEUTS SEG NFR BLD AUTO: 73 % (ref 43–75)
NRBC BLD AUTO-RTO: 0 /100 WBCS
PLATELET # BLD AUTO: 202 THOUSANDS/UL (ref 149–390)
PLATELET # BLD AUTO: 271 THOUSANDS/UL (ref 149–390)
PMV BLD AUTO: 10.5 FL (ref 8.9–12.7)
PMV BLD AUTO: 11.3 FL (ref 8.9–12.7)
POTASSIUM SERPL-SCNC: 3.8 MMOL/L (ref 3.5–5.3)
POTASSIUM SERPL-SCNC: 4 MMOL/L (ref 3.5–5.3)
PROT SERPL-MCNC: 6.5 G/DL (ref 6.4–8.4)
PROT SERPL-MCNC: 7.3 G/DL (ref 6.4–8.4)
RBC # BLD AUTO: 3.92 MILLION/UL (ref 3.81–5.12)
RBC # BLD AUTO: 4.32 MILLION/UL (ref 3.81–5.12)
SODIUM SERPL-SCNC: 133 MMOL/L (ref 135–147)
SODIUM SERPL-SCNC: 135 MMOL/L (ref 135–147)
TREPONEMA PALLIDUM IGG+IGM AB [PRESENCE] IN SERUM OR PLASMA BY IMMUNOASSAY: NORMAL
WBC # BLD AUTO: 7.71 THOUSAND/UL (ref 4.31–10.16)
WBC # BLD AUTO: 8.46 THOUSAND/UL (ref 4.31–10.16)

## 2024-01-01 PROCEDURE — NC001 PR NO CHARGE: Performed by: OBSTETRICS & GYNECOLOGY

## 2024-01-01 PROCEDURE — 85025 COMPLETE CBC W/AUTO DIFF WBC: CPT

## 2024-01-01 PROCEDURE — 99222 1ST HOSP IP/OBS MODERATE 55: CPT | Performed by: OBSTETRICS & GYNECOLOGY

## 2024-01-01 PROCEDURE — 85027 COMPLETE CBC AUTOMATED: CPT | Performed by: OBSTETRICS & GYNECOLOGY

## 2024-01-01 PROCEDURE — 80053 COMPREHEN METABOLIC PANEL: CPT | Performed by: OBSTETRICS & GYNECOLOGY

## 2024-01-01 PROCEDURE — 83735 ASSAY OF MAGNESIUM: CPT | Performed by: OBSTETRICS & GYNECOLOGY

## 2024-01-01 PROCEDURE — 59025 FETAL NON-STRESS TEST: CPT | Performed by: OBSTETRICS & GYNECOLOGY

## 2024-01-01 PROCEDURE — 83735 ASSAY OF MAGNESIUM: CPT

## 2024-01-01 PROCEDURE — 80053 COMPREHEN METABOLIC PANEL: CPT

## 2024-01-01 RX ORDER — SODIUM CHLORIDE, SODIUM LACTATE, POTASSIUM CHLORIDE, CALCIUM CHLORIDE 600; 310; 30; 20 MG/100ML; MG/100ML; MG/100ML; MG/100ML
50 INJECTION, SOLUTION INTRAVENOUS CONTINUOUS
Status: DISCONTINUED | OUTPATIENT
Start: 2024-01-01 | End: 2024-01-04

## 2024-01-01 RX ADMIN — BETAMETHASONE SODIUM PHOSPHATE AND BETAMETHASONE ACETATE 12 MG: 3; 3 INJECTION, SUSPENSION INTRA-ARTICULAR; INTRALESIONAL; INTRAMUSCULAR at 20:17

## 2024-01-01 RX ADMIN — ACETAMINOPHEN 650 MG: 325 TABLET, FILM COATED ORAL at 02:17

## 2024-01-01 RX ADMIN — SODIUM CHLORIDE, SODIUM LACTATE, POTASSIUM CHLORIDE, AND CALCIUM CHLORIDE 500 ML: .6; .31; .03; .02 INJECTION, SOLUTION INTRAVENOUS at 04:06

## 2024-01-01 RX ADMIN — MAGNESIUM SULFATE IN WATER 2 G/HR: 20 INJECTION, SOLUTION INTRAVENOUS at 08:04

## 2024-01-01 RX ADMIN — NIFEDIPINE 30 MG: 30 TABLET, FILM COATED, EXTENDED RELEASE ORAL at 09:27

## 2024-01-01 RX ADMIN — ACETAMINOPHEN 650 MG: 325 TABLET, FILM COATED ORAL at 12:58

## 2024-01-01 RX ADMIN — SODIUM CHLORIDE, SODIUM LACTATE, POTASSIUM CHLORIDE, AND CALCIUM CHLORIDE 50 ML/HR: .6; .31; .03; .02 INJECTION, SOLUTION INTRAVENOUS at 05:17

## 2024-01-01 RX ADMIN — MAGNESIUM SULFATE IN WATER 2 G/HR: 20 INJECTION, SOLUTION INTRAVENOUS at 18:23

## 2024-01-01 NOTE — PLAN OF CARE
Problem: PAIN - ADULT  Goal: Verbalizes/displays adequate comfort level or baseline comfort level  Description: Interventions:  - Encourage patient to monitor pain and request assistance  - Assess pain using appropriate pain scale  - Administer analgesics based on type and severity of pain and evaluate response  - Implement non-pharmacological measures as appropriate and evaluate response  - Consider cultural and social influences on pain and pain management  - Notify physician/advanced practitioner if interventions unsuccessful or patient reports new pain  Outcome: Progressing     Problem: INFECTION - ADULT  Goal: Absence or prevention of progression during hospitalization  Description: INTERVENTIONS:  - Assess and monitor for signs and symptoms of infection  - Monitor lab/diagnostic results  - Monitor all insertion sites, i.e. indwelling lines, tubes, and drains  - Monitor endotracheal if appropriate and nasal secretions for changes in amount and color  - [place] appropriate cooling/warming therapies per order  - Administer medications as ordered  - Instruct and encourage patient and family to use good hand hygiene technique  - Identify and instruct in appropriate isolation precautions for identified infection/condition  Outcome: Progressing  Goal: Absence of fever/infection during neutropenic period  Description: INTERVENTIONS:  - Monitor WBC    Outcome: Progressing     Problem: SAFETY ADULT  Goal: Patient will remain free of falls  Description: INTERVENTIONS:  - Educate patient/family on patient safety including physical limitations  - Instruct patient to call for assistance with activity   - Consult OT/PT to assist with strengthening/mobility   - Keep Call bell within reach  - Keep bed low and locked with side rails adjusted as appropriate  - Keep care items and personal belongings within reach  - Initiate and maintain comfort rounds  - Make Fall Risk Sign visible to staff  - Apply yellow socks and bracelet  for high fall risk patients  - Consider moving patient to room near nurses station  Outcome: Progressing  Goal: Maintain or return to baseline ADL function  Description: INTERVENTIONS:  -  Assess patient's ability to carry out ADLs; assess patient's baseline for ADL function and identify physical deficits which impact ability to perform ADLs (bathing, care of mouth/teeth, toileting, grooming, dressing, etc.)  - Assess/evaluate cause of self-care deficits   - Assess range of motion  - Assess patient's mobility; develop plan if impaired  - Assess patient's need for assistive devices and provide as appropriate  - Encourage maximum independence but intervene and supervise when necessary  - Involve family in performance of ADLs  - Assess for home care needs following discharge   - Consider OT consult to assist with ADL evaluation and planning for discharge  - Provide patient education as appropriate  Outcome: Progressing  Goal: Maintains/Returns to pre admission functional level  Description: INTERVENTIONS:  - Perform AM-PAC 6 Click Basic Mobility/ Daily Activity assessment daily.  - Set and communicate daily mobility goal to care team and patient/family/caregiver.   - Collaborate with rehabilitation services on mobility goals if consulted  - Out of bed for toileting  - Record patient progress and toleration of activity level   Outcome: Progressing     Problem: DISCHARGE PLANNING  Goal: Discharge to home or other facility with appropriate resources  Description: INTERVENTIONS:  - Identify barriers to discharge w/patient and caregiver  - Arrange for needed discharge resources and transportation as appropriate  - Identify discharge learning needs (meds, wound care, etc.)  - Arrange for interpretive services to assist at discharge as needed  - Refer to Case Management Department for coordinating discharge planning if the patient needs post-hospital services based on physician/advanced practitioner order or complex needs  related to functional status, cognitive ability, or social support system  Outcome: Progressing     Problem: Knowledge Deficit  Goal: Patient/family/caregiver demonstrates understanding of disease process, treatment plan, medications, and discharge instructions  Description: Complete learning assessment and assess knowledge base.  Interventions:  - Provide teaching at level of understanding  - Provide teaching via preferred learning methods  Outcome: Progressing     Problem: ANTEPARTUM  Goal: Maintain pregnancy as long as maternal and/or fetal condition is stable  Description: INTERVENTIONS:  - Maternal surveillance  - Fetal surveillance  - Monitor uterine activity  - Medications as ordered  - Bedrest  Outcome: Progressing

## 2024-01-01 NOTE — ASSESSMENT & PLAN NOTE
Lochia WNL   Recovering well   Appropriate bowel and bladder function   Pain well controlled   Tolerating diet   Ambulating without issues   No lower extremity tenderness  GBS negative   Rh positive   Dispo: consider d/c home PPD2 (today) if pressures and labs remain stable

## 2024-01-01 NOTE — ASSESSMENT & PLAN NOTE
S/p Magnesium x24 hr for both fetal neuroprotection and maternal seizure prophylaxis.  S/p Betamethasone  -  for fetal lung maturation.  CBC & CMP wnl on admit  AST/ALT 49/45 on repeat  --> 84/101 on   UPC 0.40  23: Labetalol 20/40 mg IV on admission  24: started Procardia XL 30 mg qd  24: increased to Procardia XL 60 mg qd  Systolic (24hrs), Av , Min:128 , Max:156   Diastolic (24hrs), Av, Min:72, Max:99    Monitor BPs  Monitor for signs/symptoms of worsening preeclampsia  CBC & CMP qd (trend & closely monitor newly rising AST/ALT)  Diet: regular  Fetal Monitoring: NST TID  Recommend IOL today for worsening liver enzymes in the setting of pre-existing preE w/ SF  Once in a labor room will check & start induction (likely to start w/ Cytotec & Gomez balloon)

## 2024-01-01 NOTE — ASSESSMENT & PLAN NOTE
Overview:  Received prenatal care in [facility] (admitted while traveling here to see family)  Pipo Hernandez, DO Ob provider address and phone numbers and fax info where DC summary needs sent  [address]   [place] [address] [phone] (Work)   [phone] (Fax)   Prenatal labs notable for hep B non-immune status  Tdap vaccine: patient declined  Contraception: POP bridge to outpatient Nexplanon ([employer] [place] [email or web address])  Birth plan: IOL previously scheduled for 1/18/24 at 6 am at 34w0d  Delivery consent: signed 12/31/23  Induction consent: signed 1/2/24    Plan:  Daily PNV  Routine obstetric care with above mentioned additions/exceptions  Postpartum contraception: POP bridge to Nexplanon outpatient ([employer] insurance)

## 2024-01-01 NOTE — CONSULTS
Consult: MFM  Monisha Liu 23 y.o. female MRN: [id number]  Unit/Bed#: LD TRIAGE 4 Encounter: [id number]      Assessment/Plan:    23 y.o.  at 31w3d admitted for inpatient monitoring for preeclampsia with severe features    31 weeks gestation of pregnancy  Assessment & Plan  Overview:  Gonorrhea/chlamydia: negative  Tdap vaccine: ordered, did not receive in [place]  Contraception: Interested in Nexplanon, not a candidate for inpatient placement  Type & Screen: q3 days  Betamethasone: -  Magnesium: for neuroprotection & seizure ppx  Birth plan:   Delivery consent: signed on 23  Regular diet  Fetal Monitoring: cEFM while on magnesium          * Preeclampsia, severe, third trimester  Assessment & Plan  Magnesium for both fetal neuroprotection and maternal seizure prophylaxis on admission x24 hrs  S/p Labetalol 20/40 on admission  Start Procardia 30 XL  Betamethasone 2023 - 2024  Anticipate iatrogenic  delivery at 34 weeks               Patient of: [facility] ([place]), Resident Service by default  This patient will be an INPATIENT  and I certify the anticipated length of stay is >2 Midnights  Discussed with Dr. Suarez and Xuan      SUBJECTIVE:    Reason for Consult / Principal Problem: Elevated blood pressures and headaches    Subspeciality: Perinatology      HPI: Monisha Liu is a 23 y.o.  with an DULCE of 2024, Date entered prior to episode creation at 31w3d who is being admitted for preeclampsia with severe features.  She was visiting from [place] when she started to have an intense headache.  She started taking her blood pressures at home and they were elevated so she came to the hospital where she was noted to have several severe range blood pressures requiring labetalol IV push.  She has had a few contractions on the monitor but is not feeling any.    Reports a history of preeclampsia in a prior pregnancy for which she was sent  over to labor and delivery from the office.  She was induced with 4 doses of Cytotec, followed by Pitocin.  There was a second-degree perineal laceration.  She was discharged on labetalol 200 twice daily that admission.  Received magnesium that admission as well.  She delivered at 38 weeks and reports she did have a postpartum hemorrhage which required 2 units of blood.  She reports she pushed for about 10 minutes.  She has been taking aspirin this pregnancy for preeclampsia prevention    Delivery Consent:  I consented the patient for delivery. The patient was counseled about the labor process. We discussed three routes of delivery including spontaneous vaginal delivery, operative vaginal delivery and  delivery. The patient was counseled on the risks of vaginal delivery including pain, vaginal/perineal tears and the need for repair at the bedside, infection and bleeding.      Patient counseled on indications necessitating operative vaginal delivery including: maternal medical indications in which patient would need to avoid pushing, prolonged second stage and nonreassuring fetal heart tracing during second stage. Patient counseled on maternal risks of vaginal delivery including increase risk of tearing and hemorrhage. We also discussed fetal risks including intracranial hemorrhage, lacerations, nerve damage or fracture. Patient is aware that NICU providers would be available at the time of delivery to assess fetal status after delivery and need for resuscitation.      She was counseled on the indications for  section including: failed induction, arrest of dilation, persistent category II tracing and arrest of descent. She was counseled on the indications for emergent  section including evidence of worsening fetal or maternal status, and that there is a risk of general anesthesia. We discussed the risks of  including bleeding, infection, and injury to surrounding structures including  the bowel and bladder.     She was counseled that there are medical and surgical methods to manage excessive postpartum bleeding. She was counseled that in the event of excessive blood loss, she may require blood transfusion which includes a small risk of blood borne diseases such as hepatitis and HIV. The patient is OK with receiving a blood transfusion if necessary.  The patient had an opportunity to ask questions and signed consent.         Patient Active Problem List   Diagnosis    Plantar fasciitis    Enthesopathy of lower extremity    Nipple discharge    Pain of left breast    Thigh laceration    Menorrhagia with regular cycle    Preeclampsia, severe, third trimester    31 weeks gestation of pregnancy       OB History    Para Term  AB Living   2 0 0 0 0 1   SAB IAB Ectopic Multiple Live Births   0 0 0 0 0      # Outcome Date GA Lbr Jethro/2nd Weight Sex Delivery Anes PTL Lv   2 Current            1                 Past Medical History:   Diagnosis Date    Asthma     Dysmenorrhea     Iron deficiency anemia     Menorrhagia     Migraine        History reviewed. No pertinent surgical history.    Social History     Tobacco Use    Smoking status: Never    Smokeless tobacco: Never   Substance Use Topics    Alcohol use: No       No Known Allergies    Medications Prior to Admission   Medication    albuterol (PROVENTIL HFA,VENTOLIN HFA) 90 mcg/act inhaler    fluticasone (FLOVENT DISKUS) 50 MCG/BLIST diskus inhaler    Junel FE  1-20 MG-MCG per tablet    loratadine (CLARITIN) 10 mg tablet           OBJECTIVE:  Vitals:  Temp:  [98.2 °F (36.8 °C)] 98.2 °F (36.8 °C)  HR:  [57-76] 71  Resp:  [16-18] 18  BP: (141-177)/() 151/86  Body mass index is 29.18 kg/m².     Physical Exam:  General: Well appearing, no distress  Respiratory: Unlabored breathing  Cardiovascular: Regular rate.  Abdomen: Soft, gravid, nontender  Fundal Height: Appropriate for gestational age.  Extremities: Warm and well perfused.   "Non tender.  SVE: Cervical Dilation: Closed  Cervical Effacement: 0  Cervical Consistency: Firm  Fetal Station: -3  Presentation: Vertex  Method: Manual  OB Examiner: [place]    FHT:   Baseline 125, moderate variability, 10 x 10 accelerations, no decelerations    TOCO:    Irritable      Prenatal Labs: Prenatal panel pending      Asia Burt, DO  OB/GYN PGY4  12/31/2023  9:28 PM        Portions of the record may have been created with voice recognition software.  Occasional wrong word or \"sound a like\" substitutions may have occurred due to the inherent limitations of voice recognition software.  Read the chart carefully and recognize, using context, where substitutions have occurred    "

## 2024-01-01 NOTE — PLAN OF CARE
Problem: PAIN - ADULT  Goal: Verbalizes/displays adequate comfort level or baseline comfort level  Description: Interventions:  - Encourage patient to monitor pain and request assistance  - Assess pain using appropriate pain scale  - Administer analgesics based on type and severity of pain and evaluate response  - Implement non-pharmacological measures as appropriate and evaluate response  - Consider cultural and social influences on pain and pain management  - Notify physician/advanced practitioner if interventions unsuccessful or patient reports new pain  Outcome: Progressing     Problem: INFECTION - ADULT  Goal: Absence or prevention of progression during hospitalization  Description: INTERVENTIONS:  - Assess and monitor for signs and symptoms of infection  - Monitor lab/diagnostic results  - Monitor all insertion sites, i.e. indwelling lines, tubes, and drains  - Monitor endotracheal if appropriate and nasal secretions for changes in amount and color  - [place] appropriate cooling/warming therapies per order  - Administer medications as ordered  - Instruct and encourage patient and family to use good hand hygiene technique  - Identify and instruct in appropriate isolation precautions for identified infection/condition  Outcome: Progressing  Goal: Absence of fever/infection during neutropenic period  Description: INTERVENTIONS:  - Monitor WBC    Outcome: Progressing     Problem: SAFETY ADULT  Goal: Patient will remain free of falls  Description: INTERVENTIONS:  - Educate patient/family on patient safety including physical limitations  - Instruct patient to call for assistance with activity   - Consult OT/PT to assist with strengthening/mobility   - Keep Call bell within reach  - Keep bed low and locked with side rails adjusted as appropriate  - Keep care items and personal belongings within reach  - Initiate and maintain comfort rounds  - Make Fall Risk Sign visible to staff  - Offer Toileting every 2 Hours,  in advance of need  - Apply yellow socks and bracelet for high fall risk patients  - Consider moving patient to room near nurses station  Outcome: Progressing  Goal: Maintain or return to baseline ADL function  Description: INTERVENTIONS:  -  Assess patient's ability to carry out ADLs; assess patient's baseline for ADL function and identify physical deficits which impact ability to perform ADLs (bathing, care of mouth/teeth, toileting, grooming, dressing, etc.)  - Assess/evaluate cause of self-care deficits   - Assess range of motion  - Assess patient's mobility; develop plan if impaired  - Assess patient's need for assistive devices and provide as appropriate  - Encourage maximum independence but intervene and supervise when necessary  - Involve family in performance of ADLs  - Assess for home care needs following discharge   - Consider OT consult to assist with ADL evaluation and planning for discharge  - Provide patient education as appropriate  Outcome: Progressing  Goal: Maintains/Returns to pre admission functional level  Description: INTERVENTIONS:  - Perform AM-PAC 6 Click Basic Mobility/ Daily Activity assessment daily.  - Set and communicate daily mobility goal to care team and patient/family/caregiver.   - Collaborate with rehabilitation services on mobility goals if consulted  - Out of bed for toileting  - Record patient progress and toleration of activity level   Outcome: Progressing     Problem: DISCHARGE PLANNING  Goal: Discharge to home or other facility with appropriate resources  Description: INTERVENTIONS:  - Identify barriers to discharge w/patient and caregiver  - Arrange for needed discharge resources and transportation as appropriate  - Identify discharge learning needs (meds, wound care, etc.)  - Arrange for interpretive services to assist at discharge as needed  - Refer to Case Management Department for coordinating discharge planning if the patient needs post-hospital services based on  physician/advanced practitioner order or complex needs related to functional status, cognitive ability, or social support system  Outcome: Progressing     Problem: Knowledge Deficit  Goal: Patient/family/caregiver demonstrates understanding of disease process, treatment plan, medications, and discharge instructions  Description: Complete learning assessment and assess knowledge base.  Interventions:  - Provide teaching at level of understanding  - Provide teaching via preferred learning methods  Outcome: Progressing     Problem: ANTEPARTUM  Goal: Maintain pregnancy as long as maternal and/or fetal condition is stable  Description: INTERVENTIONS:  - Maternal surveillance  - Fetal surveillance  - Monitor uterine activity  - Medications as ordered  - Bedrest  Outcome: Progressing

## 2024-01-01 NOTE — CONSULTS
NICU Prenatal Consult   Monisha Liu 23 y.o. female MRN: [id number]  Unit/Bed#: -01 Encounter: [id number]    Consulting Physician: Francisco Urbano MD      A NICU Prenatal Consult has been requested by ob due to expected  birth.     Monisha Liu is a 23 y.o. ,  at 31w4d GA     Monisha is expecting a male baby,  She intends to breastfeed      Along with Monisha , her father, her mother and her partner were present for the consultation.     Prenatal Care:Yes, description Preeclampsia.     Prenatal Labs:  Lab Results   Component Value Date/Time    ABO Grouping B 2023 09:26 PM    Rh Factor Positive 2023 09:26 PM    Rubella IgG Quant 22.3 2023 09:26 PM       Unknown labs at this time:     Pregnancy complications:   Patient Active Problem List   Diagnosis    Plantar fasciitis    Enthesopathy of lower extremity    Nipple discharge    Pain of left breast    Thigh laceration    Menorrhagia with regular cycle    Preeclampsia, severe, third trimester    31 weeks gestation of pregnancy     Maternal medical history:   Past Medical History:   Diagnosis Date    Asthma     Dysmenorrhea     Iron deficiency anemia     Menorrhagia     Migraine      Medications at home:   Medications Prior to Admission   Medication    albuterol (PROVENTIL HFA,VENTOLIN HFA) 90 mcg/act inhaler    fluticasone (FLOVENT DISKUS) 50 MCG/BLIST diskus inhaler    Junel  1-20 MG-MCG per tablet    loratadine (CLARITIN) 10 mg tablet     Maternal social history:  Social History     Tobacco Use    Smoking status: Never    Smokeless tobacco: Never   Substance Use Topics    Alcohol use: No     Maternal  medications:  steroids: Betamethasone. Labetalol, and Procardia.      I spoke with Monisha Liu today regarding the upcoming birth of her son.  We discussed the baby's possible medical problems and the specialized care needed to address these problems.  This discussion included, but was not limited to:      Attendance of physician/REBECA/ nursing, and/or respiratory therapist at the delivery and delivery room management , Possibility of comfort care, and the parents would like 31, Possible need for IV access, umbilical lines, and/or PICC lines, Possible need for prolonged parenteral nutrition, Potential need for transfusion of blood products, Risk of bronchopulmonary dysplasia, Risk of feedings problems and the potential need for IV fluids and gavage tube feeds, Risk of hypoglycemia requiring dextrose infusion, Risk of hypothermia and need for radiant warmer and/or isolette, Risk of immature cardiorespiratory control and need for monitoring and/or caffeine, Risk of intraventricular hemorrhage and possible need for brain ultrasound, Risk of jaundice requiring phototherapy, Risk of necrotizing enterocolitis and advantages of expressed breast milk, Risk of prolonged patent ductus arteriosus and possible need for pharmacologic or surgical treatment , Risk of respiratory distress and possible need for support (oxygen, CPAP, intubation, and/or surfactant), Risk of sepsis and possible need for lab tests and IV antibiotics, and Survival and neurodevelopmental outcomes for babies born at 31 weeks         All of Monisha's questions were answered to the best of my ability, and she was encouraged to contact us with any further questions.      Thank you for including us in the care of Monisha Liu. Please reach out to the on-call Neonatologist via [facility] for any further questions that may arise.    I spent 60 minutes in consultation with Monisha Liu, of which 60% was in direct communication.    Kike Fisher MD  - Medicine

## 2024-01-01 NOTE — PROGRESS NOTES
"MFM Progress note   Monisha Liu 23 y.o. female MRN: [id number]  Unit/Bed#: -01 Encounter: [id number]    Assessment/Plan:    Ms. Liu is a 23 y.o.  at 31w4d, Hospital Day: 2, admitted with preeclampsia with severe features .    31 weeks gestation of pregnancy  Assessment & Plan  Overview:  Gonorrhea/chlamydia: negative  Tdap vaccine: ordered, did not receive in [place]  Contraception: Interested in Nexplanon, not a candidate for inpatient placement  Type & Screen: q3 days  Betamethasone: -  Magnesium: for neuroprotection & seizure ppx  Birth plan:   Delivery consent: signed on 23  Regular diet  Fetal Monitoring: cEFM while on magnesium          * Preeclampsia, severe, third trimester  Assessment & Plan  Magnesium for both fetal neuroprotection and maternal seizure prophylaxis on admission x24 hrs  UPC 0.40  S/p Labetalol 20/40 on admission  Start Procardia 30 XL  Betamethasone 2023 - 2024  Anticipate iatrogenic  delivery at 34 weeks             Subjective:    Monisha Liu is sound asleep, her RN reports she just fell asleep. Overnight events: had period of sinusoidal FHT overnight around 0030 that resolved w/ repositioning. She has a low baseline, confirmed by maternal pulse oximetry. She did report feeling tired earlier, equates this to the last time she had mag    Objective:  /71   Pulse 82   Temp 98.5 °F (36.9 °C) (Axillary)   Resp 18   Ht 5' 4\" (1.626 m)   Wt 77.1 kg (170 lb)   LMP  (Exact Date)   SpO2 96%   BMI 29.18 kg/m²       Lab Results   Component Value Date    WBC 6.73 2023    HGB 10.5 (L) 2023    HCT 33.1 (L) 2023    MCV 85 2023     2023       Lab Results   Component Value Date    CALCIUM 8.7 2023    K 3.8 2023    CO2 20 (L) 2023     2023    BUN 7 2023    CREATININE 0.80 2023         FHT:  Baseline Rate (FHR): 105 bpm  Variability: Moderate  Accelerations: 10 x " 10 (<32 weeks)  Decelerations: None  FHR Category: Category II    TOCO:   Contraction Frequency (minutes): none  Contraction Duration (seconds): 50-60  Contraction Intensity: Mild      General: Well appearing, no distress  Respiratory: Unlabored breathing  Cardiovascular: Regular rate.  Abdomen: Soft, gravid, nontender  Fundal Height: Appropriate for gestational age.  SVE: Cervical Dilation: Closed  Cervical Effacement: 0  Cervical Consistency: Firm  Fetal Station: -3  Presentation: Vertex  Method: Manual  OB Examiner: [place]  Extremities: Warm and well perfused.  Non tender.      Asia Burt DO  1/1/2024  5:48 AM

## 2024-01-01 NOTE — DISCHARGE SUMMARY
Discharge Summary - Monisha Liu 23 y.o. female MRN: [id number]    Unit/Bed#: -01 Encounter: [id number]    Admission Date: 2023     Discharge Date: 2024    Patient Active Problem List   Diagnosis    Plantar fasciitis    Enthesopathy of lower extremity    Nipple discharge    Pain of left breast    Thigh laceration    Menorrhagia with regular cycle    Preeclampsia, severe, third trimester     (spontaneous vaginal delivery)    Nonimmune to hepatitis B virus       OBGYN Practice: [facility] ([place]), [facility] Resident [facility] by Default    Hospital Course:   Monisha Liu is a 23 y.o. [id number] 32w2d . She presented to labor and delivery 31 weeks 4 days with a headache and elevated blood pressures at home.  She was diagnosed with preeclampsia with severe features.  She received magnesium, IV labetalol 20/40 + Procardia 30 XL on admission. She completed 24 hours of magnesium therapy, and she was given a full course of betamethasone for fetal lung maturity.  Due to increasing in her blood pressure medication requirements as well as uptrending liver enzymes, there was concern that she was trending towards HELLP syndrome and decision was made to induce at 32 weeks 2 days. She was noted to be 1/50/-4 and was induced with a Gomez balloon and Cytotec.  She then received low-dose Pitocin titration.  She received an epidural for pain control.  Amniotomy was performed for clear fluid.  There was noted to be a category 2 tracing with variables and IUPC with amnioinfusion was started. She progressed to full cervical dilation and began to push. Of note, magnesium gtt was turned back on during labor course, and she received Mg for 24 hours postpartum.     Delivery Findings:  Monisha delivered a viable male  on 2024  2:31 AM  via Vaginal, Spontaneous  . The delivery was uncomplicated.    Baby's Weight: 3 lbs 6.5oz  Apgar scores: 6  and 8  at 1 and 5 minutes, respectively  Anesthesia:  Epidural ,   QBL: Non-Surgical QBL (mL): 10         was transferred to NICU. Patient tolerated the procedure well and was transferred to recovery in stable condition.     Her post-partum course was uncomplicated. Her liver enzymes downtrended, and her blood pressures were well controlled on Procardia XL 60 qD.   Her post-partum pain was well controlled with oral analgesics. On day of discharge she was ambulating, voiding spontaneously, tolerating oral intake and hemodynamically stable. Mom's blood type is B positive and  Rhogam was not given.    She was discharged home on postpartum day #2 without complications. Patient was instructed to follow up with her OB as an outpatient and was given appropriate warnings to call doctor or provider if she develops signs of infection or uncontrolled pain.    Discharge Problem List by Issue:   *  (spontaneous vaginal delivery)  Assessment & Plan  Overview:  Received prenatal care in [facility] (admitted while traveling here to see family)  Pipo Hernandez, DO Ob provider address and phone numbers and fax info where DC summary needs sent  [address]   [place] [address] [phone] (Work)   [phone] (Fax)   Prenatal labs notable for hep B non-immune status  Tdap vaccine: patient declined  Contraception: POP bridge to outpatient Nexplanon ([employer] insurance)  Birth plan: IOL previously scheduled for 24 at 6 am at 34w0d  Delivery consent: signed 23  Induction consent: signed 24    Plan:  Daily PNV  Routine obstetric care with above mentioned additions/exceptions  Postpartum contraception: POP bridge to Nexplanon outpatient ([employer] insurance)    Preeclampsia, severe, third trimester  Assessment & Plan  S/p Magnesium x24 hr for both fetal neuroprotection and maternal seizure prophylaxis.  S/p Betamethasone  -  for fetal lung maturation.  CBC & CMP wnl on admit  AST/ALT 49/45 on repeat  -->  84/101 on   UPC 0.40  23: Labetalol 20/40 mg IV on admission  24: started Procardia XL 30 mg qd  24: increased to Procardia XL 60 mg qd  Systolic (24hrs), Av , Min:128 , Max:156   Diastolic (24hrs), Av, Min:72, Max:99    Monitor BPs  Monitor for signs/symptoms of worsening preeclampsia  CBC & CMP qd (trend & closely monitor newly rising AST/ALT)  Diet: regular  Fetal Monitoring: NST TID  Recommend IOL today for worsening liver enzymes in the setting of pre-existing preE w/ SF  Once in a labor room will check & start induction (likely to start w/ Cytotec & Gomez balloon)    Nonimmune to hepatitis B virus  Assessment & Plan  Hep B vaccine series initiated inpatient (series to be completed outpatient with PCP in [place])         Disposition: Home    Planned Readmission: No    Discharge Medications:   Please see AVS    Discharge instructions :   -Do not place anything (no partner, tampons or douche) in your vagina for 6 weeks.  -You may walk for exercise for the first 6 weeks then gradually return to your usual activities.   -Please do not drive for 1 week if you have no stitches and for 2 weeks if you have stitches or underwent a  delivery.    -You may take baths or shower per your preference.   -Please look at your bust (breasts) in the mirror daily and call your doctor for redness or tenderness or increased warmth.   - If you have had a  section please look at your incision daily as well and call provider for increasing redness or steady drainage from the incision.   -Please call your doctor's office if temperature > 100.4*F or 38* C, worsening pain or a foul discharge.    Follow Up:  - Follow up in 1 weeks for blood pressure check.    Marietta Aviles MD  PGY-1 OBGYN

## 2024-01-01 NOTE — ASSESSMENT & PLAN NOTE
: SRBPs treated with 20 mg IV labetalol, CBC wnl, CMP with Cr 0.8, AST and ALT wnl, P/C 0.4  Systolic (24hrs), Av , Min:121 , Max:152   Diastolic (24hrs), Av, Min:67, Max:95  S/p 24 hr magnesium  PO Regimen: Procardia 60 XL qd    Pressures in last 24hours:   Systolic (24hrs), Av , Min:111 , Max:136   Diastolic (24hrs), Av, Min:65, Max:88  Currently asymptomatic  Continue to trend labs and pressures    Platelet        Results from last 7 days   Lab Units 24  0545 24  2220 24  1601 24  1010 24  0404 24  1420   PLATELETS Thousands/uL 213 223 251 273 278 318 318   , Renal      Results from last 7 days   Lab Units 24  0532 24  22224  1601 24  1010 24  0404 24  1420   BUN mg/dL 17 15 14 14 14 13 13   CREATININE mg/dL 1.06 0.94 1.08 0.89 0.88 0.90 0.88   EGFR ml/min/1.73sq m 74 85 72 91 92 90 92   , or LFT's       Results from last 7 days   Lab Units 24  0532 24  2220 24  1601 24  1010 24  0404 24  1420   AST U/L 96* 126* 186* 240* 285* 289* 144*   ALT U/L 157* 192* 249* 297* 315* 310* 164*   ALK PHOS U/L 196* 215* 242* 236* 248* 279* 288*   TOTAL PROTEIN g/dL 5.7* 6.0* 6.5 6.5 6.5 6.9 6.9   ALBUMIN g/dL 3.0* 3.1* 3.4* 3.4* 3.4* 3.6 3.6   TOTAL BILIRUBIN mg/dL 0.18* 0.19* 0.19* 0.22 0.21 0.19* 0.18*

## 2024-01-02 PROBLEM — Z78.9 NONIMMUNE TO HEPATITIS B VIRUS: Status: ACTIVE | Noted: 2024-01-02

## 2024-01-02 LAB
BACTERIA UR CULT: NORMAL
GP B STREP DNA SPEC QL NAA+PROBE: NEGATIVE

## 2024-01-02 PROCEDURE — 59025 FETAL NON-STRESS TEST: CPT | Performed by: OBSTETRICS & GYNECOLOGY

## 2024-01-02 PROCEDURE — 99232 SBSQ HOSP IP/OBS MODERATE 35: CPT | Performed by: OBSTETRICS & GYNECOLOGY

## 2024-01-02 PROCEDURE — NC001 PR NO CHARGE: Performed by: OBSTETRICS & GYNECOLOGY

## 2024-01-02 PROCEDURE — 90715 TDAP VACCINE 7 YRS/> IM: CPT | Performed by: OBSTETRICS & GYNECOLOGY

## 2024-01-02 PROCEDURE — 90746 HEPB VACCINE 3 DOSE ADULT IM: CPT

## 2024-01-02 RX ADMIN — NIFEDIPINE 30 MG: 30 TABLET, FILM COATED, EXTENDED RELEASE ORAL at 08:43

## 2024-01-02 RX ADMIN — Medication 1 TABLET: at 08:43

## 2024-01-02 RX ADMIN — HEPATITIS B VACCINE (RECOMBINANT) 20 MCG: 20 INJECTION, SUSPENSION INTRAMUSCULAR at 17:29

## 2024-01-02 RX ADMIN — TETANUS TOXOID, REDUCED DIPHTHERIA TOXOID AND ACELLULAR PERTUSSIS VACCINE, ADSORBED 0.5 ML: 5; 2.5; 8; 8; 2.5 SUSPENSION INTRAMUSCULAR at 08:44

## 2024-01-02 NOTE — PLAN OF CARE
Problem: PAIN - ADULT  Goal: Verbalizes/displays adequate comfort level or baseline comfort level  Description: Interventions:  - Encourage patient to monitor pain and request assistance  - Assess pain using appropriate pain scale  - Administer analgesics based on type and severity of pain and evaluate response  - Implement non-pharmacological measures as appropriate and evaluate response  - Consider cultural and social influences on pain and pain management  - Notify physician/advanced practitioner if interventions unsuccessful or patient reports new pain  Outcome: Progressing     Problem: INFECTION - ADULT  Goal: Absence or prevention of progression during hospitalization  Description: INTERVENTIONS:  - Assess and monitor for signs and symptoms of infection  - Monitor lab/diagnostic results  - Monitor all insertion sites, i.e. indwelling lines, tubes, and drains  - Monitor endotracheal if appropriate and nasal secretions for changes in amount and color  - [place] appropriate cooling/warming therapies per order  - Administer medications as ordered  - Instruct and encourage patient and family to use good hand hygiene technique  - Identify and instruct in appropriate isolation precautions for identified infection/condition  Outcome: Progressing  Goal: Absence of fever/infection during neutropenic period  Description: INTERVENTIONS:  - Monitor WBC    Outcome: Progressing     Problem: SAFETY ADULT  Goal: Patient will remain free of falls  Description: INTERVENTIONS:  - Educate patient/family on patient safety including physical limitations  - Instruct patient to call for assistance with activity   - Consult OT/PT to assist with strengthening/mobility   - Keep Call bell within reach  - Keep bed low and locked with side rails adjusted as appropriate  - Keep care items and personal belongings within reach  - Initiate and maintain comfort rounds  - Make Fall Risk Sign visible to staff  - Offer Toileting every  Hours,  in advance of need  - Initiate/Maintain alarm  - Obtain necessary fall risk management equipment:   - Apply yellow socks and bracelet for high fall risk patients  - Consider moving patient to room near nurses station  Outcome: Progressing  Goal: Maintain or return to baseline ADL function  Description: INTERVENTIONS:  -  Assess patient's ability to carry out ADLs; assess patient's baseline for ADL function and identify physical deficits which impact ability to perform ADLs (bathing, care of mouth/teeth, toileting, grooming, dressing, etc.)  - Assess/evaluate cause of self-care deficits   - Assess range of motion  - Assess patient's mobility; develop plan if impaired  - Assess patient's need for assistive devices and provide as appropriate  - Encourage maximum independence but intervene and supervise when necessary  - Involve family in performance of ADLs  - Assess for home care needs following discharge   - Consider OT consult to assist with ADL evaluation and planning for discharge  - Provide patient education as appropriate  Outcome: Progressing  Goal: Maintains/Returns to pre admission functional level  Description: INTERVENTIONS:  - Perform AM-PAC 6 Click Basic Mobility/ Daily Activity assessment daily.  - Set and communicate daily mobility goal to care team and patient/family/caregiver.   - Collaborate with rehabilitation services on mobility goals if consulted  - Perform Range of Motion  times a day.  - Reposition patient every  hours.  - Dangle patient  times a day  - Stand patient  times a day  - Ambulate patient  times a day  - Out of bed to chair  times a day   - Out of bed for meals  times a day  - Out of bed for toileting  - Record patient progress and toleration of activity level   Outcome: Progressing     Problem: DISCHARGE PLANNING  Goal: Discharge to home or other facility with appropriate resources  Description: INTERVENTIONS:  - Identify barriers to discharge w/patient and caregiver  - Arrange for  needed discharge resources and transportation as appropriate  - Identify discharge learning needs (meds, wound care, etc.)  - Arrange for interpretive services to assist at discharge as needed  - Refer to Case Management Department for coordinating discharge planning if the patient needs post-hospital services based on physician/advanced practitioner order or complex needs related to functional status, cognitive ability, or social support system  Outcome: Progressing     Problem: Knowledge Deficit  Goal: Patient/family/caregiver demonstrates understanding of disease process, treatment plan, medications, and discharge instructions  Description: Complete learning assessment and assess knowledge base.  Interventions:  - Provide teaching at level of understanding  - Provide teaching via preferred learning methods  Outcome: Progressing     Problem: ANTEPARTUM  Goal: Maintain pregnancy as long as maternal and/or fetal condition is stable  Description: INTERVENTIONS:  - Maternal surveillance  - Fetal surveillance  - Monitor uterine activity  - Medications as ordered  - Bedrest  Outcome: Progressing

## 2024-01-02 NOTE — UTILIZATION REVIEW
Initial Clinical Review    Admission: Date/Time/Statement:   Admission Orders (From admission, onward)       Ordered        23  Inpatient Admission  Once                          Orders Placed This Encounter   Procedures    Inpatient Admission     Standing Status:   Standing     Number of Occurrences:   1     Order Specific Question:   Level of Care     Answer:   Med Surg [16]     Order Specific Question:   Estimated length of stay     Answer:   More than 2 Midnights     Order Specific Question:   Certification     Answer:   I certify that inpatient services are medically necessary for this patient for a duration of greater than two midnights. See H&P and MD Progress Notes for additional information about the patient's course of treatment.     ED Arrival Information       Expected   -    Arrival   2023 16:57    Acuity   Urgent              Means of arrival   Walk-In    Escorted by   -    Service   OB/GYN    Admission type   Emergency              Arrival complaint   htn/ 7 weeks preg             Chief Complaint   Patient presents with    Hypertension - Pregnant     Patient is approx 7 months pregnant. Reports noting elevated BP readings today (140/90's) w/ accompanying HA. Hx preeclampsia with prior pregnancies.        Initial Presentation: 23 y.o. female , presented to the ED @ [facility] sent to L&D Triage, from home via walk in.   Admitted as Inpatient due to  Preeclampsia with severe features based on sustained SRBPs requiring IV antihypertensive medication with urine P/C 0.4.       Date: 2023    with an DULCE of 2024, at 31w3d who is being admitted for preeclampsia with severe features.  She has a history of preeclampsia in her prior pregnancy for which she delivered at 38 weeks.  She reports that she was discharged postpartum with a blood pressure medication that she took for 2 to 4 weeks and then was taken off the blood pressure medication at that time.  She denies history of  chronic hypertension, any high blood pressures outside of pregnancy, any oral antihypertensive medications now.  Recently she has had some mild range blood pressures for which she has been monitored, and she has been on aspirin throughout this pregnancy.  Today she reports headache that is improving and otherwise feels well.  She denies contractions, loss of fluid, vaginal bleeding, and reports good fetal movement.  She is living in [place] and is in town visiting family for the holidays.   : SRBPs treated with 20 mg IV labetalol, CBC wnl, CMP with Cr 0.8, AST and ALT wnl, P/C 0.4.  Mag infusion started.  F/u admission labs.  Perinatology consult.   Neonatology consult.  Betamethasone for fetal lung protection.  FHT:  Baseline 125, moderate variability, 15x15 accelerations present, absent decelerations   TOCO:   Contractions irregular    2023  Consult MFM:  23 y.o.  at 31w3d admitted for inpatient monitoring for preeclampsia with severe features   31 weeks gestation of pregnancy  Assessment & Plan  Overview:  Gonorrhea/chlamydia: negative  Tdap vaccine: ordered, did not receive in [place]  Contraception: Interested in Nexplanon, not a candidate for inpatient placement  Type & Screen: q3 days  Betamethasone: -  Magnesium: for neuroprotection & seizure ppx  Birth plan:   Delivery consent: signed on 23  Regular diet  Fetal Monitoring: cEFM while on magnesium   * Preeclampsia, severe, third trimester  Assessment & Plan  Magnesium for both fetal neuroprotection and maternal seizure prophylaxis on admission x24 hrs  S/p Labetalol 20/40 on admission  Start Procardia 30 XL  Betamethasone 2023 - 2024  Anticipate iatrogenic  delivery at 34 weeks    Day 2: 2024  Betamethasone given  & .  Start Procardia XL.  Anticipate iatrogenic  delivery @ 34weeks.  Mg given.  Regular diet.  Fetal Monitoring cEFM while on Mg.      2024  Consult  Neonatology:  Attendance of physician/REBECA/ nursing, and/or respiratory therapist at the delivery and delivery room management , Possibility of comfort care, and the parents would like 31, Possible need for IV access, umbilical lines, and/or PICC lines, Possible need for prolonged parenteral nutrition, Potential need for transfusion of blood products, Risk of bronchopulmonary dysplasia, Risk of feedings problems and the potential need for IV fluids and gavage tube feeds, Risk of hypoglycemia requiring dextrose infusion, Risk of hypothermia and need for radiant warmer and/or isolette, Risk of immature cardiorespiratory control and need for monitoring and/or caffeine, Risk of intraventricular hemorrhage and possible need for brain ultrasound, Risk of jaundice requiring phototherapy, Risk of necrotizing enterocolitis and advantages of expressed breast milk, Risk of prolonged patent ductus arteriosus and possible need for pharmacologic or surgical treatment , Risk of respiratory distress and possible need for support (oxygen, CPAP, intubation, and/or surfactant), Risk of sepsis and possible need for lab tests and IV antibiotics, and Survival and neurodevelopmental outcomes for babies born at 31 weeks       ED Triage Vitals [12/31/23 1706]   Temperature Pulse Respirations Blood Pressure SpO2   98.2 °F (36.8 °C) 76 16 (!) 172/94 99 %      Temp Source Heart Rate Source Patient Position - Orthostatic VS BP Location FiO2 (%)   Oral Monitor Sitting Right arm --      Pain Score       6          Wt Readings from Last 1 Encounters:   12/31/23 77.1 kg (170 lb)     Additional Vital Signs:   Date/Time Temp Pulse Resp BP MAP (mmHg) SpO2 O2 Device Cardiac (WDL) Patient Position - Orthostatic VS   01/02/24 1622 98.3 °F (36.8 °C) 73 18 133/80 -- 96 % -- -- Sitting   01/02/24 1156 98.5 °F (36.9 °C) 85 18 121/76 -- 98 % None (Room air) -- Lying   01/02/24 0753 98.1 °F (36.7 °C) 70 20 144/90  -- 99 % None (Room air) -- Lying   BP: taken  "twice at 01/02/24 0753   Comment rows:   OBSERV: according to the patient the baby is active. at 01/02/24 0753   01/01/24 2351 98.1 °F (36.7 °C) 81 16 123/70 -- 98 % -- -- Lying   01/01/24 2213 98.1 °F (36.7 °C) 88 18 128/82 -- 99 % None (Room air) -- Sitting   01/01/24 2045 -- -- -- -- -- -- -- -- --   Comment rows:   OBSERV: Pt looks and reports to feel much better once mag was turned off at 2010. at 01/01/24 2045 01/01/24 2010 -- -- -- -- -- -- -- -- --   Comment rows:   OBSERV: Mag d/c per Dr Rivera at 01/01/24 2010 01/01/24 1958 97.9 °F (36.6 °C) 92 18 141/86 -- 98 % None (Room air) -- Sitting   01/01/24 1950 -- -- -- -- -- -- -- -- --   Comment rows:   OBSERV: pt reports and looks lethargic. pt reports severe bilateral lower extremity weakness and states \"they feel like they're going to give out\" when ambulating to the bathroom with nurse. required assist x2. RN notified Dr Rivera. Instructed to call nurse before getting up. at 01/01/24 1950 01/01/24 1918 -- -- -- -- -- -- -- WDL --   Comment rows:   OBSERV: pt reports positive FM. declines ctx and any leakage of fluid. at 01/01/24 1918 01/01/24 1809 98.1 °F (36.7 °C) 83 18 122/75 -- 98 % None (Room air) -- Lying   01/01/24 1553 98.2 °F (36.8 °C) 85 17 127/74 -- 98 % None (Room air) -- Sitting   01/01/24 1414 98 °F (36.7 °C) 91 16 138/88 -- 99 % None (Room air) -- Lying   01/01/24 1216 98.2 °F (36.8 °C) 89 18 137/76 -- 100 % None (Room air) -- Lying   01/01/24 1037 -- 84 -- 132/83 -- 100 % -- -- --   01/01/24 0927 -- 80 -- 120/76 -- -- -- -- --   01/01/24 0808 97.7 °F (36.5 °C) 79 18 137/88 -- 100 % None (Room air) -- Lying   01/01/24 0800 -- -- -- -- -- -- -- WDL --   01/01/24 0609 97.5 °F (36.4 °C) 78 18 127/85 -- -- -- -- --   01/01/24 0606 -- 71 -- -- -- 97 % -- -- --   01/01/24 0526 -- -- -- -- -- -- -- -- --   Comment rows:   OBSERV: Reviewed FHT tracing, patient's muscle weakness, +2 reflexes with Dr. Burt, in person. at 01/01/24 0526 01/01/24 " 0511 -- 82 -- 133/71 -- 96 % -- -- --   01/01/24 0456 -- 74 -- -- -- 97 % -- -- --   01/01/24 0431 -- 75 -- -- -- 97 % -- -- --   01/01/24 0411 -- 75 -- -- -- 97 % -- -- --   01/01/24 0410 -- 72 18 129/60 -- -- -- -- --   01/01/24 0337 -- 81 -- -- -- 98 % -- -- --   01/01/24 0330 -- 81 -- -- -- 97 % -- -- --   01/01/24 0310 -- 81 -- 136/86 -- -- -- -- --   01/01/24 0233 -- 84 -- -- -- 98 % -- -- --   01/01/24 0211 -- 83 18 138/88 -- 97 % -- -- --   Comment rows:   OBSERV: Weak legs when ambulated. Requires assist X 1. Instructed to call nurse before getting up. Declines bedpan at this time. at 01/01/24 0211 01/01/24 0110 -- 88 -- 136/85 -- -- -- -- --   01/01/24 0000 -- -- -- 133/89 -- -- -- WDL --   12/31/23 2350 -- 80 -- -- -- -- -- -- --   12/31/23 2348 98.5 °F (36.9 °C) 74 18 -- -- 99 % -- -- --   12/31/23 2229 -- 75 -- 138/92 -- -- -- -- --   12/31/23 2219 -- 72 -- 154/88 -- -- -- -- --   12/31/23 2208 98.4 °F (36.9 °C) 75 18 145/86 -- 98 % None (Room air) -- Sitting   12/31/23 2149 -- 80 -- 132/92 -- -- -- -- --   12/31/23 2139 -- 78 -- 125/93 -- -- -- -- --   12/31/23 2130 -- 79 -- 142/94 -- -- -- -- --   12/31/23 2119 -- 78 -- 132/90 -- -- -- -- --   12/31/23 2108 -- 69 -- 156/99 -- -- -- -- --   12/31/23 2103 -- 64 -- 153/92 -- -- -- -- --   12/31/23 2029 -- 71 -- 151/86 -- -- -- -- --   12/31/23 2019 -- 66 -- 152/90 -- -- -- -- --   12/31/23 2010 -- 66 -- 163/105 Abnormal  -- -- -- -- --   12/31/23 1959 -- 74 -- 155/98 -- -- -- -- --   12/31/23 1933 -- 57 -- 168/97  -- -- -- -- --   BP: Dr oakes made aware at 12/31/23 1933 12/31/23 1919 -- 62 -- 169/95  -- -- -- -- --   BP: Dr Oakes made aware at 12/31/23 1919 12/31/23 1903 -- 65 18 141/93 -- 98 % -- -- Lying   12/31/23 1828 -- 65 -- -- -- 99 % -- -- --   12/31/23 1800 -- 63 16 162/86 117 98 % None (Room air) -- Sitting   12/31/23 1740 -- -- -- 177/106 Abnormal  135 -- -- -- --   12/31/23 1706 98.2 °F (36.8 °C) 76 16 172/94 Abnormal   -- 99 % None (Room air) -- Sitting         Pertinent Labs/Diagnostic Test Results:   Results from last 7 days   Lab Units 01/01/24 1959 01/01/24 0605 12/31/23  1811   WBC Thousand/uL 8.46 7.71 6.73   HEMOGLOBIN g/dL 11.5 10.6* 10.5*   HEMATOCRIT % 36.8 33.5* 33.1*   PLATELETS Thousands/uL 271 202 206   NEUTROS ABS Thousands/µL 6.20  --  3.94     Results from last 7 days   Lab Units 01/01/24 2110 01/01/24 1959 01/01/24 0605 12/31/23  1811   SODIUM mmol/L  --  133* 135 137   POTASSIUM mmol/L  --  3.8 4.0 3.8   CHLORIDE mmol/L  --  104 106 108   CO2 mmol/L  --  18* 18* 20*   ANION GAP mmol/L  --  11 11 9   BUN mg/dL  --  7 6 7   CREATININE mg/dL  --  0.86 0.79 0.80   EGFR ml/min/1.73sq m  --  95 105 104   CALCIUM mg/dL  --  7.2* 7.8* 8.7   MAGNESIUM mg/dL 6.3*  --  6.0* 1.8*     Results from last 7 days   Lab Units 01/01/24 1959 01/01/24 0605 12/31/23  1811   AST U/L 25 21 20   ALT U/L 20 17 14   ALK PHOS U/L 259* 230* 231*   TOTAL PROTEIN g/dL 7.3 6.5 6.3*   ALBUMIN g/dL 3.9 3.5 3.5   TOTAL BILIRUBIN mg/dL 0.19* 0.17* 0.22     Results from last 7 days   Lab Units 01/01/24 1959 01/01/24 0605 12/31/23  1811   GLUCOSE RANDOM mg/dL 160* 128 71     Results from last 7 days   Lab Units 12/31/23  1811   PROTIME seconds 13.3   INR  0.95   PTT seconds 29     Results from last 7 days   Lab Units 12/31/23  2126   HEP B S AG  Non-reactive     Results from last 7 days   Lab Units 12/31/23 1817 12/31/23  1803   CLARITY UA   --  Clear   COLOR UA   --  Light Yellow   SPEC GRAV UA   --  1.009   PH UA   --  7.0   GLUCOSE UA mg/dl  --  Negative   KETONES UA mg/dl  --  Negative   BLOOD UA   --  Negative   PROTEIN UA mg/dl  --  Trace*   NITRITE UA   --  Negative   BILIRUBIN UA   --  Negative   UROBILINOGEN UA (BE) mg/dl  --  <2.0   LEUKOCYTES UA   --  Negative   WBC UA /hpf  --  1-2   RBC UA /hpf  --  None Seen   BACTERIA UA /hpf  --  Innumerable*   EPITHELIAL CELLS WET PREP /hpf  --  Occasional   CREATININE UR mg/dL 60.0  --     PROTEIN UR mg/dL 24  --    PROT/CREAT RATIO UR  0.40*  --      Results from last 7 days   Lab Units 12/31/23  1803   URINE CULTURE  No Growth <1000 cfu/mL     ED Treatment:   Medication Administration from 12/31/2023 1656 to 12/31/2023 1835         Date/Time Order Dose Route Action     12/31/2023 1812 EST magnesium sulfate 2 g/50 mL IVPB (premix) 2 g 2 g Intravenous New Bag          Past Medical History:   Diagnosis Date    Asthma     Dysmenorrhea     Iron deficiency anemia     Menorrhagia     Migraine          Admitting Diagnosis: HTN (hypertension) [I10]  Pregnancy [Z34.90]  Age/Sex: 23 y.o. female  Admission Orders:  Betamethasone given as directed x 2 doses  Regular diet  Obtain UA  CBC, Plt, BMP q3day  Fetal nonstress test TID  Sid SCDs    Scheduled Medications:  hepatitis B vac (recombinant), 1 mL, Intramuscular, Once  labetalol, 40 mg, Intravenous, Once  NIFEdipine, 30 mg, Oral, Daily  prenatal multivitamin, 1 tablet, Oral, Daily  betamethasone acetate-betamethasone sodium phosphate (CELESTONE) injection 12 mg  Dose: 12 mg        x 2 doses 12/31 & 1/1  Freq: Every 24 hours Route: IM  Start: 12/31/23 2015 End: 01/01/24 2017     Continuous IV Infusions:  lactated ringers, 50 mL/hr, Intravenous, Continuous  magnesium sulfate 20 g/500 mL infusion (premix)  Rate: 50 mL/hr Dose: 2 g/hr  Freq: Continuous Route: IV  Last Dose: Stopped (01/01/24 2010)  Start: 12/31/23 2030 End: 01/01/24 2005     PRN Meds:  acetaminophen, 650 mg, Oral, Q4H PRN   x 2 doses 1/1  calcium carbonate, 1,000 mg, Oral, Daily PRN  docusate sodium, 100 mg, Oral, BID PRN  ondansetron, 4 mg, Intravenous, Q8H PRN  simethicone, 80 mg, Oral, 4x Daily PRN        IP CONSULT TO PERINATOLOGY  IP CONSULT TO NEONATOLOGY    Network Utilization Review Department  ATTENTION: Please call with any questions or concerns to [phone] and carefully listen to the prompts so that you are directed to the right person. All voicemails are confidential.   For  Discharge needs, contact Care Management DC Support Team at [phone] opt. [phone]  Send all requests for admission clinical reviews, approved or denied determinations and any other requests to dedicated fax number below belonging to the campus where the patient is receiving treatment. List of dedicated fax numbers for the Facilities:  FACILITY NAME UR FAX NUMBER   ADMISSION DENIALS (Administrative/Medical Necessity) [phone]   DISCHARGE SUPPORT TEAM (NETWORK) [phone]   PARENT CHILD HEALTH (Maternity/NICU/Pediatrics) [phone]   [facility] [phone]   [facility] [phone]   [facility] [phone]   [facility] [phone]   [facility] [phone]   [facility] [phone]   [facility] [phone]   [facility] [phone]   [facility] [phone]   [facility] [phone]   [facility] [phone]

## 2024-01-02 NOTE — PROGRESS NOTES
Progress Note - Maternal Fetal Medicine   Monisha Liu 23 y.o. female MRN: [id number]  Unit/Bed#: -01 Encounter: [id number]  Admit date: 2023.  Today's date: 24    Assessment/Plan     Ms. Liu is a 23 y.o.  at 31w5d, Hospital Day: 3, admitted with preeclampsia with severe features.  By issue:    * Preeclampsia, severe, third trimester  Assessment & Plan  S/p Magnesium x24 hr for both fetal neuroprotection and maternal seizure prophylaxis.  S/p Betamethasone  -  for fetal lung maturation.  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 B virus  Assessment & Plan  Recommend initiation of vaccination series in pregnancy    31 weeks gestation of pregnancy  Assessment & Plan  Overview:  Received prenatal care in [facility] (admitted while traveling here to see family)  Prenatal labs notable for hep B non-immune status  Tdap vaccine: patient declined  Contraception: interested in Nexplanon, not a candidate for inpatient placement  Birth plan:  (to be scheduled for 34w0d)  Delivery consent: signed 23    Plan:  F/u prenatal labs (collected & pending)  Daily PNV  Diet: regular  Fetal Monitoring: NST TID  Iatrogenic  delivery at 34w0d for preeclampsia with severe features (to be scheduled & induction consent to be signed)      Subjective    Contractions: no  Loss of fluid: no  Vaginal bleeding: no  Fetal movement: yes    Pain: no  Headaches: no  Visual changes: no  Chest pain: no  Shortness of breath: no  Nausea: no  Vomiting/Diarrhea: no  Dysuria: no  Leg pain: no    Objective      Patient Vitals for the past 24 hrs:   BP Temp Temp src Pulse Resp SpO2   24 2351 123/70 98.1 °F (36.7 °C) Oral 81 16 98 %   24  2213 128/82 98.1 °F (36.7 °C) Oral 88 18 99 %   01/01/24 1958 141/86 97.9 °F (36.6 °C) Oral 92 18 98 %   01/01/24 1809 122/75 98.1 °F (36.7 °C) Oral 83 18 98 %   01/01/24 1553 127/74 98.2 °F (36.8 °C) Oral 85 17 98 %   01/01/24 1414 138/88 98 °F (36.7 °C) Oral 91 16 99 %   01/01/24 1216 137/76 98.2 °F (36.8 °C) Oral 89 18 100 %   01/01/24 1037 132/83 -- -- 84 -- 100 %   01/01/24 0927 120/76 -- -- 80 -- --   01/01/24 0808 137/88 97.7 °F (36.5 °C) Oral 79 18 100 %       Physical Exam  Constitutional:       General: She is not in acute distress.     Appearance: Normal appearance. She is not ill-appearing.   HENT:      Mouth/Throat:      Mouth: Mucous membranes are moist.   Eyes:      Extraocular Movements: Extraocular movements intact.   Cardiovascular:      Rate and Rhythm: Normal rate.   Pulmonary:      Effort: Pulmonary effort is normal.   Abdominal:      General: There is no distension.      Palpations: Abdomen is soft.      Tenderness: There is no abdominal tenderness. There is no guarding.   Musculoskeletal:         General: No swelling or tenderness.      Right lower leg: No edema.      Left lower leg: No edema.   Skin:     General: Skin is warm and dry.      Coloration: Skin is not jaundiced or pale.   Neurological:      General: No focal deficit present.      Mental Status: She is alert.   Psychiatric:         Mood and Affect: Mood normal.         Behavior: Behavior normal.         Thought Content: Thought content normal.         Judgment: Judgment normal.         I/O         12/31 0701  01/01 0700 01/01 0701 01/02 0700    P.O. 400 900    IV Piggyback 500     Total Intake(mL/kg) 900 (11.7) 900 (11.7)    Urine (mL/kg/hr) 1400 3200 (1.7)    Total Output 1400 3200    Net -500 -2300                  Invasive Devices       Peripheral Intravenous Line  Duration             Peripheral IV 12/31/23 Left Antecubital 1 day    Peripheral IV 12/31/23 Right Antecubital 1 day                    Results from last 7 days    Lab Units 24  1811   WBC Thousand/uL 8.46 7.71 6.73   NEUTROS ABS Thousands/µL 6.20  --  3.94   HEMOGLOBIN g/dL 11.5 10.6* 10.5*   MCV fL 85 86 85   PLATELETS Thousands/uL 271 202 206     Results from last 7 days   Lab Units 24  1811   POTASSIUM mmol/L  --  3.8 4.0 3.8   CHLORIDE mmol/L  --  104 106 108   CO2 mmol/L  --  18* 18* 20*   BUN mg/dL  --  7 6 7   CREATININE mg/dL  --  0.86 0.79 0.80   EGFR ml/min/1.73sq m  --  95 105 104   MAGNESIUM mg/dL 6.3*  --  6.0* 1.8*     Results from last 7 days   Lab Units 24  1811   AST U/L 25 21 20   ALT U/L 20 17 14     Results from last 7 days   Lab Units 24  1811   PLATELETS Thousands/uL 271 202 206   INR   --   --  0.95   PROTIME seconds  --   --  13.3   PTT seconds  --   --  29         Results from last 7 days   Lab Units 23  1817   PROT/CREAT RATIO UR  0.40*                   Brief review of pertinent history:  Past Medical History:   Diagnosis Date    Asthma     Dysmenorrhea     Iron deficiency anemia     Menorrhagia     Migraine      History reviewed. No pertinent surgical history.  OB History    Para Term  AB Living   2 1 1 0 0 1   SAB IAB Ectopic Multiple Live Births   0 0 0 0 0      # Outcome Date GA Lbr Jethro/2nd Weight Sex Delivery Anes PTL Lv   2 Current            1 Term                Fetal data:  Nonstress test: date 24 Time: 603 - 628  Baseline:  120 bpm  Variability: moderate  Accelerations: present, 10x10 (<32w)  Decelerations: absent  Contractions: absent  Assessment: reactive  Plan: continue TID and PRN    MFM ultrasound report key findings:   23 growth scan in [facility]: EFW 25%tile, left ventricular echogenic focus (cfDNA neg)  Consider growth scan in network (today vs. outpatient)    MEDS:   Medication Administration - last 24 hours from 2024 0612 to  01/02/2024 0612         Date/Time Order Dose Route Action Action by     01/01/2024 1258 EST acetaminophen (TYLENOL) tablet 650 mg 650 mg Oral Given Merlyn Villasenor RN     01/01/2024 2017 EST betamethasone acetate-betamethasone sodium phosphate (CELESTONE) injection 12 mg 12 mg Intramuscular Given Edda Andrews RN     01/01/2024 0927 EST NIFEdipine (PROCARDIA XL) 24 hr tablet 30 mg 30 mg Oral Given Merlyn Villasenor RN     01/01/2024 2010 EST magnesium sulfate 20 g/500 mL infusion (premix) 0 g/hr Intravenous Stopped Edda Andrews RN     01/01/2024 1823 EST magnesium sulfate 20 g/500 mL infusion (premix) 2 g/hr Intravenous New Bag Susan Tavarez RN     01/01/2024 0804 EST magnesium sulfate 20 g/500 mL infusion (premix) 2 g/hr Intravenous New Bag Merlyn Villasenor RN     01/01/2024 0932 EST tetanus-diphtheria-acellular pertussis (BOOSTRIX) IM injection 0.5 mL 0.5 mL Intramuscular Not Given Merlyn Villasenor RN          Current Facility-Administered Medications   Medication Dose Route Frequency    acetaminophen (TYLENOL) tablet 650 mg  650 mg Oral Q4H PRN    calcium carbonate (TUMS) chewable tablet 1,000 mg  1,000 mg Oral Daily PRN    docusate sodium (COLACE) capsule 100 mg  100 mg Oral BID PRN    labetalol (NORMODYNE) injection 40 mg  40 mg Intravenous Once    lactated ringers infusion  50 mL/hr Intravenous Continuous    NIFEdipine (PROCARDIA XL) 24 hr tablet 30 mg  30 mg Oral Daily    ondansetron (ZOFRAN) injection 4 mg  4 mg Intravenous Q8H PRN    simethicone (MYLICON) chewable tablet 80 mg  80 mg Oral 4x Daily PRN    tetanus-diphtheria-acellular pertussis (BOOSTRIX) IM injection 0.5 mL  0.5 mL Intramuscular Once            Oxana Mcdonough MD  OBGYN, PGY-3  1/2/2024  6:12 AM

## 2024-01-02 NOTE — ASSESSMENT & PLAN NOTE
Hep B vaccine series initiated inpatient (series to be completed outpatient with PCP in [place])

## 2024-01-02 NOTE — PLAN OF CARE
Problem: PAIN - ADULT  Goal: Verbalizes/displays adequate comfort level or baseline comfort level  Description: Interventions:  - Encourage patient to monitor pain and request assistance  - Assess pain using appropriate pain scale  - Administer analgesics based on type and severity of pain and evaluate response  - Implement non-pharmacological measures as appropriate and evaluate response  - Consider cultural and social influences on pain and pain management  - Notify physician/advanced practitioner if interventions unsuccessful or patient reports new pain  1/2/2024 0818 by Adriano Laughlin RN  Outcome: Progressing  1/2/2024 0817 by Adriano Laughlin RN  Outcome: Progressing     Problem: INFECTION - ADULT  Goal: Absence or prevention of progression during hospitalization  Description: INTERVENTIONS:  - Assess and monitor for signs and symptoms of infection  - Monitor lab/diagnostic results  - Monitor all insertion sites, i.e. indwelling lines, tubes, and drains  - Monitor endotracheal if appropriate and nasal secretions for changes in amount and color  - [place] appropriate cooling/warming therapies per order  - Administer medications as ordered  - Instruct and encourage patient and family to use good hand hygiene technique  - Identify and instruct in appropriate isolation precautions for identified infection/condition  1/2/2024 0818 by Adriano Laughlin RN  Outcome: Progressing  1/2/2024 0817 by Adriano Laughlin RN  Outcome: Progressing  Goal: Absence of fever/infection during neutropenic period  Description: INTERVENTIONS:  - Monitor WBC    1/2/2024 0818 by Adriano Laughlin RN  Outcome: Progressing  1/2/2024 0817 by Adriano Laughlin RN  Outcome: Progressing     Problem: SAFETY ADULT  Goal: Patient will remain free of falls  Description: INTERVENTIONS:  - Educate patient/family on patient safety including physical limitations  - Instruct patient to call for  assistance with activity   - Consult OT/PT to assist with strengthening/mobility   - Keep Call bell within reach  - Keep bed low and locked with side rails adjusted as appropriate  - Keep care items and personal belongings within reach  - Initiate and maintain comfort rounds  - Make Fall Risk Sign visible to staff  - Apply yellow socks and bracelet for high fall risk patients  - Consider moving patient to room near nurses station  1/2/2024 0818 by Adriano Laughlin RN  Outcome: Progressing  1/2/2024 0817 by Adriano Laughlin RN  Outcome: Progressing  Goal: Maintain or return to baseline ADL function  Description: INTERVENTIONS:  -  Assess patient's ability to carry out ADLs; assess patient's baseline for ADL function and identify physical deficits which impact ability to perform ADLs (bathing, care of mouth/teeth, toileting, grooming, dressing, etc.)  - Assess/evaluate cause of self-care deficits   - Assess range of motion  - Assess patient's mobility; develop plan if impaired  - Assess patient's need for assistive devices and provide as appropriate  - Encourage maximum independence but intervene and supervise when necessary  - Involve family in performance of ADLs  - Assess for home care needs following discharge   - Consider OT consult to assist with ADL evaluation and planning for discharge  - Provide patient education as appropriate  1/2/2024 0818 by Adriano Laughlin RN  Outcome: Progressing  1/2/2024 0817 by Adriano Laughlin RN  Outcome: Progressing  Goal: Maintains/Returns to pre admission functional level  Description: INTERVENTIONS:  - Perform AM-PAC 6 Click Basic Mobility/ Daily Activity assessment daily.  - Set and communicate daily mobility goal to care team and patient/family/caregiver.   - Collaborate with rehabilitation services on mobility goals if consulted  - Out of bed for toileting  - Record patient progress and toleration of activity level   1/2/2024 0818 by Adriano  Ignacio Laughlin RN  Outcome: Progressing  1/2/2024 0817 by Adriano Laughlin RN  Outcome: Progressing     Problem: DISCHARGE PLANNING  Goal: Discharge to home or other facility with appropriate resources  Description: INTERVENTIONS:  - Identify barriers to discharge w/patient and caregiver  - Arrange for needed discharge resources and transportation as appropriate  - Identify discharge learning needs (meds, wound care, etc.)  - Arrange for interpretive services to assist at discharge as needed  - Refer to Case Management Department for coordinating discharge planning if the patient needs post-hospital services based on physician/advanced practitioner order or complex needs related to functional status, cognitive ability, or social support system  1/2/2024 0818 by Adriano Laughlin RN  Outcome: Progressing  1/2/2024 0817 by Adriano Laughlin RN  Outcome: Progressing     Problem: Knowledge Deficit  Goal: Patient/family/caregiver demonstrates understanding of disease process, treatment plan, medications, and discharge instructions  Description: Complete learning assessment and assess knowledge base.  Interventions:  - Provide teaching at level of understanding  - Provide teaching via preferred learning methods  1/2/2024 0818 by Adriano Laughlin RN  Outcome: Progressing  1/2/2024 0817 by Adriano Laughlin RN  Outcome: Progressing     Problem: ANTEPARTUM  Goal: Maintain pregnancy as long as maternal and/or fetal condition is stable  Description: INTERVENTIONS:  - Maternal surveillance  - Fetal surveillance  - Monitor uterine activity  - Medications as ordered  - Bedrest  1/2/2024 0818 by Adriano Laughlin RN  Outcome: Progressing  1/2/2024 0817 by Adriano Laughlin RN  Outcome: Progressing     Problem: NEUROSENSORY - ADULT  Goal: Achieves stable or improved neurological status  Description: INTERVENTIONS  - Monitor and report changes in neurological status  - Monitor  vital signs such as temperature, blood pressure, glucose, and any other labs ordered   - Initiate measures to prevent increased intracranial pressure  - Monitor for seizure activity and implement precautions if appropriate      Outcome: Progressing  Goal: Remains free of injury related to seizures activity  Description: INTERVENTIONS  - Maintain airway, patient safety  and administer oxygen as ordered  - Monitor patient for seizure activity, document and report duration and description of seizure to physician/advanced practitioner  - If seizure occurs,  ensure patient safety during seizure  - Reorient patient post seizure  - Seizure pads on all 4 side rails  - Instruct patient/family to notify RN of any seizure activity including if an aura is experienced  - Instruct patient/family to call for assistance with activity based on nursing assessment  - Administer anti-seizure medications if ordered    Outcome: Progressing     Problem: CARDIOVASCULAR - ADULT  Goal: Maintains optimal cardiac output and hemodynamic stability  Description: INTERVENTIONS:  - Monitor I/O, vital signs and rhythm  - Monitor for S/S and trends of decreased cardiac output  - Administer and titrate ordered vasoactive medications to optimize hemodynamic stability  - Assess quality of pulses, skin color and temperature  - Assess for signs of decreased coronary artery perfusion  - Instruct patient to report change in severity of symptoms  Outcome: Progressing

## 2024-01-03 PROCEDURE — 59025 FETAL NON-STRESS TEST: CPT | Performed by: OBSTETRICS & GYNECOLOGY

## 2024-01-03 PROCEDURE — 99232 SBSQ HOSP IP/OBS MODERATE 35: CPT | Performed by: OBSTETRICS & GYNECOLOGY

## 2024-01-03 PROCEDURE — 76816 OB US FOLLOW-UP PER FETUS: CPT | Performed by: OBSTETRICS & GYNECOLOGY

## 2024-01-03 RX ADMIN — Medication 1 TABLET: at 08:49

## 2024-01-03 RX ADMIN — DOCUSATE SODIUM 100 MG: 100 CAPSULE, LIQUID FILLED ORAL at 21:44

## 2024-01-03 RX ADMIN — NIFEDIPINE 30 MG: 30 TABLET, FILM COATED, EXTENDED RELEASE ORAL at 08:49

## 2024-01-03 NOTE — PLAN OF CARE
Problem: PAIN - ADULT  Goal: Verbalizes/displays adequate comfort level or baseline comfort level  Description: Interventions:  - Encourage patient to monitor pain and request assistance  - Assess pain using appropriate pain scale  - Administer analgesics based on type and severity of pain and evaluate response  - Implement non-pharmacological measures as appropriate and evaluate response  - Consider cultural and social influences on pain and pain management  - Notify physician/advanced practitioner if interventions unsuccessful or patient reports new pain  1/2/2024 2219 by Marilee Goss RN  Outcome: Progressing  1/2/2024 2137 by Marilee Goss RN  Outcome: Progressing     Problem: INFECTION - ADULT  Goal: Absence or prevention of progression during hospitalization  Description: INTERVENTIONS:  - Assess and monitor for signs and symptoms of infection  - Monitor lab/diagnostic results  - Monitor all insertion sites, i.e. indwelling lines, tubes, and drains  - Monitor endotracheal if appropriate and nasal secretions for changes in amount and color  - [place] appropriate cooling/warming therapies per order  - Administer medications as ordered  - Instruct and encourage patient and family to use good hand hygiene technique  - Identify and instruct in appropriate isolation precautions for identified infection/condition  1/2/2024 2219 by Marilee Goss RN  Outcome: Progressing  1/2/2024 2137 by Marilee Goss RN  Outcome: Progressing  Goal: Absence of fever/infection during neutropenic period  Description: INTERVENTIONS:  - Monitor WBC    1/2/2024 2219 by Marilee Goss RN  Outcome: Progressing  1/2/2024 2137 by Marilee Goss RN  Outcome: Progressing     Problem: SAFETY ADULT  Goal: Patient will remain free of falls  Description: INTERVENTIONS:  - Educate patient/family on patient safety including physical limitations  - Instruct patient to call for assistance with activity   - Consult OT/PT to assist with strengthening/mobility   -  Keep Call bell within reach  - Keep bed low and locked with side rails adjusted as appropriate  - Keep care items and personal belongings within reach  - Initiate and maintain comfort rounds  - Make Fall Risk Sign visible to staff  - Apply yellow socks and bracelet for high fall risk patients  - Consider moving patient to room near nurses station  1/2/2024 2219 by Marilee Goss RN  Outcome: Progressing  1/2/2024 2137 by Marilee Goss RN  Outcome: Progressing  Goal: Maintain or return to baseline ADL function  Description: INTERVENTIONS:  -  Assess patient's ability to carry out ADLs; assess patient's baseline for ADL function and identify physical deficits which impact ability to perform ADLs (bathing, care of mouth/teeth, toileting, grooming, dressing, etc.)  - Assess/evaluate cause of self-care deficits   - Assess range of motion  - Assess patient's mobility; develop plan if impaired  - Assess patient's need for assistive devices and provide as appropriate  - Encourage maximum independence but intervene and supervise when necessary  - Involve family in performance of ADLs  - Assess for home care needs following discharge   - Consider OT consult to assist with ADL evaluation and planning for discharge  - Provide patient education as appropriate  1/2/2024 2219 by Marilee Goss RN  Outcome: Progressing  1/2/2024 2137 by Marilee Goss RN  Outcome: Progressing  Goal: Maintains/Returns to pre admission functional level  Description: INTERVENTIONS:  - Perform AM-PAC 6 Click Basic Mobility/ Daily Activity assessment daily.  - Set and communicate daily mobility goal to care team and patient/family/caregiver.   - Collaborate with rehabilitation services on mobility goals if consulted  - Out of bed for toileting  - Record patient progress and toleration of activity level   1/2/2024 2219 by Marilee Goss RN  Outcome: Progressing  1/2/2024 2137 by Marilee Goss RN  Outcome: Progressing     Problem: DISCHARGE PLANNING  Goal: Discharge to  home or other facility with appropriate resources  Description: INTERVENTIONS:  - Identify barriers to discharge w/patient and caregiver  - Arrange for needed discharge resources and transportation as appropriate  - Identify discharge learning needs (meds, wound care, etc.)  - Arrange for interpretive services to assist at discharge as needed  - Refer to Case Management Department for coordinating discharge planning if the patient needs post-hospital services based on physician/advanced practitioner order or complex needs related to functional status, cognitive ability, or social support system  1/2/2024 2219 by Marilee Goss RN  Outcome: Progressing  1/2/2024 2137 by Marilee Goss RN  Outcome: Progressing     Problem: Knowledge Deficit  Goal: Patient/family/caregiver demonstrates understanding of disease process, treatment plan, medications, and discharge instructions  Description: Complete learning assessment and assess knowledge base.  Interventions:  - Provide teaching at level of understanding  - Provide teaching via preferred learning methods  1/2/2024 2219 by Marilee Goss RN  Outcome: Progressing  1/2/2024 2137 by Marilee Goss RN  Outcome: Progressing     Problem: ANTEPARTUM  Goal: Maintain pregnancy as long as maternal and/or fetal condition is stable  Description: INTERVENTIONS:  - Maternal surveillance  - Fetal surveillance  - Monitor uterine activity  - Medications as ordered  - Bedrest  1/2/2024 2219 by Marilee Goss RN  Outcome: Progressing  1/2/2024 2137 by Marilee Goss RN  Outcome: Progressing     Problem: NEUROSENSORY - ADULT  Goal: Achieves stable or improved neurological status  Description: INTERVENTIONS  - Monitor and report changes in neurological status  - Monitor vital signs such as temperature, blood pressure, glucose, and any other labs ordered   - Initiate measures to prevent increased intracranial pressure  - Monitor for seizure activity and implement precautions if appropriate      1/2/2024 2219  by Marilee Ana Paula, RN  Outcome: Progressing  1/2/2024 2137 by Marilee Goss RN  Outcome: Progressing  Goal: Remains free of injury related to seizures activity  Description: INTERVENTIONS  - Maintain airway, patient safety  and administer oxygen as ordered  - Monitor patient for seizure activity, document and report duration and description of seizure to physician/advanced practitioner  - If seizure occurs,  ensure patient safety during seizure  - Reorient patient post seizure  - Seizure pads on all 4 side rails  - Instruct patient/family to notify RN of any seizure activity including if an aura is experienced  - Instruct patient/family to call for assistance with activity based on nursing assessment  - Administer anti-seizure medications if ordered    1/2/2024 2219 by Marilee Goss RN  Outcome: Progressing  1/2/2024 2137 by Marilee Goss RN  Outcome: Progressing     Problem: CARDIOVASCULAR - ADULT  Goal: Maintains optimal cardiac output and hemodynamic stability  Description: INTERVENTIONS:  - Monitor I/O, vital signs and rhythm  - Monitor for S/S and trends of decreased cardiac output  - Administer and titrate ordered vasoactive medications to optimize hemodynamic stability  - Assess quality of pulses, skin color and temperature  - Assess for signs of decreased coronary artery perfusion  - Instruct patient to report change in severity of symptoms  1/2/2024 2219 by Marilee Goss RN  Outcome: Progressing  1/2/2024 2137 by Marilee Goss RN  Outcome: Progressing

## 2024-01-03 NOTE — PLAN OF CARE
Problem: PAIN - ADULT  Goal: Verbalizes/displays adequate comfort level or baseline comfort level  Description: Interventions:  - Encourage patient to monitor pain and request assistance  - Assess pain using appropriate pain scale  - Administer analgesics based on type and severity of pain and evaluate response  - Implement non-pharmacological measures as appropriate and evaluate response  - Consider cultural and social influences on pain and pain management  - Notify physician/advanced practitioner if interventions unsuccessful or patient reports new pain  Outcome: Progressing     Problem: INFECTION - ADULT  Goal: Absence or prevention of progression during hospitalization  Description: INTERVENTIONS:  - Assess and monitor for signs and symptoms of infection  - Monitor lab/diagnostic results  - Monitor all insertion sites, i.e. indwelling lines, tubes, and drains  - Monitor endotracheal if appropriate and nasal secretions for changes in amount and color  - [place] appropriate cooling/warming therapies per order  - Administer medications as ordered  - Instruct and encourage patient and family to use good hand hygiene technique  - Identify and instruct in appropriate isolation precautions for identified infection/condition  Outcome: Progressing  Goal: Absence of fever/infection during neutropenic period  Description: INTERVENTIONS:  - Monitor WBC    Outcome: Progressing     Problem: SAFETY ADULT  Goal: Patient will remain free of falls  Description: INTERVENTIONS:  - Educate patient/family on patient safety including physical limitations  - Instruct patient to call for assistance with activity   - Consult OT/PT to assist with strengthening/mobility   - Keep Call bell within reach  - Keep bed low and locked with side rails adjusted as appropriate  - Keep care items and personal belongings within reach  - Initiate and maintain comfort rounds  - Make Fall Risk Sign visible to staff  - Apply yellow socks and bracelet  for high fall risk patients  - Consider moving patient to room near nurses station  Outcome: Progressing  Goal: Maintain or return to baseline ADL function  Description: INTERVENTIONS:  -  Assess patient's ability to carry out ADLs; assess patient's baseline for ADL function and identify physical deficits which impact ability to perform ADLs (bathing, care of mouth/teeth, toileting, grooming, dressing, etc.)  - Assess/evaluate cause of self-care deficits   - Assess range of motion  - Assess patient's mobility; develop plan if impaired  - Assess patient's need for assistive devices and provide as appropriate  - Encourage maximum independence but intervene and supervise when necessary  - Involve family in performance of ADLs  - Assess for home care needs following discharge   - Consider OT consult to assist with ADL evaluation and planning for discharge  - Provide patient education as appropriate  Outcome: Progressing  Goal: Maintains/Returns to pre admission functional level  Description: INTERVENTIONS:  - Perform AM-PAC 6 Click Basic Mobility/ Daily Activity assessment daily.  - Set and communicate daily mobility goal to care team and patient/family/caregiver.   - Collaborate with rehabilitation services on mobility goals if consulted  - Out of bed for toileting  - Record patient progress and toleration of activity level   Outcome: Progressing     Problem: DISCHARGE PLANNING  Goal: Discharge to home or other facility with appropriate resources  Description: INTERVENTIONS:  - Identify barriers to discharge w/patient and caregiver  - Arrange for needed discharge resources and transportation as appropriate  - Identify discharge learning needs (meds, wound care, etc.)  - Arrange for interpretive services to assist at discharge as needed  - Refer to Case Management Department for coordinating discharge planning if the patient needs post-hospital services based on physician/advanced practitioner order or complex needs  related to functional status, cognitive ability, or social support system  Outcome: Progressing     Problem: Knowledge Deficit  Goal: Patient/family/caregiver demonstrates understanding of disease process, treatment plan, medications, and discharge instructions  Description: Complete learning assessment and assess knowledge base.  Interventions:  - Provide teaching at level of understanding  - Provide teaching via preferred learning methods  Outcome: Progressing     Problem: ANTEPARTUM  Goal: Maintain pregnancy as long as maternal and/or fetal condition is stable  Description: INTERVENTIONS:  - Maternal surveillance  - Fetal surveillance  - Monitor uterine activity  - Medications as ordered  - Bedrest  Outcome: Progressing     Problem: NEUROSENSORY - ADULT  Goal: Achieves stable or improved neurological status  Description: INTERVENTIONS  - Monitor and report changes in neurological status  - Monitor vital signs such as temperature, blood pressure, glucose, and any other labs ordered   - Initiate measures to prevent increased intracranial pressure  - Monitor for seizure activity and implement precautions if appropriate      Outcome: Progressing  Goal: Remains free of injury related to seizures activity  Description: INTERVENTIONS  - Maintain airway, patient safety  and administer oxygen as ordered  - Monitor patient for seizure activity, document and report duration and description of seizure to physician/advanced practitioner  - If seizure occurs,  ensure patient safety during seizure  - Reorient patient post seizure  - Seizure pads on all 4 side rails  - Instruct patient/family to notify RN of any seizure activity including if an aura is experienced  - Instruct patient/family to call for assistance with activity based on nursing assessment  - Administer anti-seizure medications if ordered    Outcome: Progressing     Problem: CARDIOVASCULAR - ADULT  Goal: Maintains optimal cardiac output and hemodynamic  stability  Description: INTERVENTIONS:  - Monitor I/O, vital signs and rhythm  - Monitor for S/S and trends of decreased cardiac output  - Administer and titrate ordered vasoactive medications to optimize hemodynamic stability  - Assess quality of pulses, skin color and temperature  - Assess for signs of decreased coronary artery perfusion  - Instruct patient to report change in severity of symptoms  Outcome: Progressing

## 2024-01-03 NOTE — ED ATTENDING ATTESTATION
12/31/2023  IMony DO, saw and evaluated the patient. I have discussed the patient with the resident/non-physician practitioner and agree with the resident's/non-physician practitioner's findings, Plan of Care, and MDM as documented in the resident's/non-physician practitioner's note, except where noted. All available labs and Radiology studies were reviewed.  I was present for key portions of any procedure(s) performed by the resident/non-physician practitioner and I was immediately available to provide assistance.       At this point I agree with the current assessment done in the Emergency Department.  I have conducted an independent evaluation of this patient a history and physical is as follows:    ED Course   23-year-old female G2, P1 at approximately 31 weeks gestation presents for evaluation of elevated blood pressure.  Patient is currently visiting the area from [place] where she has received her prenatal care.  Patient reports a history of preeclampsia with previous pregnancy and difficulty managing blood pressure towards the end of her pregnancy.  She does not have a history of hypertension when she is not pregnant.  She has noted intermittent headaches over the past few weeks.  Today she was monitoring her blood pressure was elevated.  Patient denies blurred vision.  No focal motor or sensory deficits.  No history of seizure.  Patient denies abnormal vaginal bleeding.  She is feeling the baby move.    Past medical history: Preeclampsia    Physical exam: Patient is awake and alert, no acute distress.  Resting comfortably.  Pupils are equal and reactive.  Neck is supple without JVD.  Heart is regular rate and rhythm without ectopy.  Lungs are clear to auscultation.  Chest wall is nontender to palpation.  Abdomen is soft, nontender, nondistended with normal active bowel sounds.  Abdomen is gravid.  Size consistent with dates.  No CVA tenderness.  No lower extremity edema.  No focal motor or  sensory deficits.  Speech is clear and appropriate.    Assessment: 23-year-old female G2, P1 at 31 weeks gestation presents with elevated blood pressure and headache.  Differential diagnosis includes was not limited to preeclampsia with or without severe features, gestational hypertension, electrolyte abnormality, tension headache,, migraine, hypertensive urgency/emergency.    Plan: Patient with significantly elevated blood pressure and headache concerning for preeclampsia with severe features.  Will initiate IV magnesium.  Case was discussed with OB/GYN who recommends that the patient be transferred upstairs to OB triage for continued care.  Patient agreed with plan  Critical Care Time  CriticalCare Time    Date/Time: 12/31/2023 6:36 PM    Performed by: Mony Quinonez DO  Authorized by: Mony Quinonez DO    Critical care provider statement:     Critical care time (minutes):  30    Critical care time was exclusive of:  Separately billable procedures and treating other patients and teaching time    Critical care was necessary to treat or prevent imminent or life-threatening deterioration of the following conditions:  CNS failure or compromise and circulatory failure    Critical care was time spent personally by me on the following activities:  Blood draw for specimens, obtaining history from patient or surrogate, discussions with consultants, evaluation of patient's response to treatment, examination of patient, review of old charts, re-evaluation of patient's condition, ordering and review of radiographic studies, ordering and review of laboratory studies, development of treatment plan with patient or surrogate and ordering and performing treatments and interventions    I assumed direction of critical care for this patient from another provider in my specialty: no

## 2024-01-04 PROBLEM — Z3A.32 32 WEEKS GESTATION OF PREGNANCY: Status: ACTIVE | Noted: 2024-01-04

## 2024-01-04 LAB
ABO GROUP BLD: NORMAL
ALBUMIN SERPL BCP-MCNC: 3.4 G/DL (ref 3.5–5)
ALP SERPL-CCNC: 255 U/L (ref 34–104)
ALT SERPL W P-5'-P-CCNC: 45 U/L (ref 7–52)
ANION GAP SERPL CALCULATED.3IONS-SCNC: 7 MMOL/L
AST SERPL W P-5'-P-CCNC: 49 U/L (ref 13–39)
BILIRUB SERPL-MCNC: 0.18 MG/DL (ref 0.2–1)
BLD GP AB SCN SERPL QL: NEGATIVE
BUN SERPL-MCNC: 15 MG/DL (ref 5–25)
CALCIUM ALBUM COR SERPL-MCNC: 8.7 MG/DL (ref 8.3–10.1)
CALCIUM SERPL-MCNC: 8.2 MG/DL (ref 8.4–10.2)
CHLORIDE SERPL-SCNC: 105 MMOL/L (ref 96–108)
CO2 SERPL-SCNC: 21 MMOL/L (ref 21–32)
CREAT SERPL-MCNC: 0.74 MG/DL (ref 0.6–1.3)
ERYTHROCYTE [DISTWIDTH] IN BLOOD BY AUTOMATED COUNT: 14.9 % (ref 11.6–15.1)
GFR SERPL CREATININE-BSD FRML MDRD: 114 ML/MIN/1.73SQ M
GLUCOSE SERPL-MCNC: 102 MG/DL (ref 65–140)
HCT VFR BLD AUTO: 34.5 % (ref 34.8–46.1)
HGB BLD-MCNC: 10.9 G/DL (ref 11.5–15.4)
MCH RBC QN AUTO: 27 PG (ref 26.8–34.3)
MCHC RBC AUTO-ENTMCNC: 31.6 G/DL (ref 31.4–37.4)
MCV RBC AUTO: 85 FL (ref 82–98)
PLATELET # BLD AUTO: 267 THOUSANDS/UL (ref 149–390)
PMV BLD AUTO: 11.1 FL (ref 8.9–12.7)
POTASSIUM SERPL-SCNC: 4.8 MMOL/L (ref 3.5–5.3)
PROT SERPL-MCNC: 6.3 G/DL (ref 6.4–8.4)
RBC # BLD AUTO: 4.04 MILLION/UL (ref 3.81–5.12)
RH BLD: POSITIVE
SODIUM SERPL-SCNC: 133 MMOL/L (ref 135–147)
SPECIMEN EXPIRATION DATE: NORMAL
WBC # BLD AUTO: 10.06 THOUSAND/UL (ref 4.31–10.16)

## 2024-01-04 PROCEDURE — 85027 COMPLETE CBC AUTOMATED: CPT

## 2024-01-04 PROCEDURE — 86850 RBC ANTIBODY SCREEN: CPT

## 2024-01-04 PROCEDURE — 99232 SBSQ HOSP IP/OBS MODERATE 35: CPT | Performed by: OBSTETRICS & GYNECOLOGY

## 2024-01-04 PROCEDURE — 80053 COMPREHEN METABOLIC PANEL: CPT

## 2024-01-04 PROCEDURE — 59025 FETAL NON-STRESS TEST: CPT | Performed by: OBSTETRICS & GYNECOLOGY

## 2024-01-04 PROCEDURE — 86900 BLOOD TYPING SEROLOGIC ABO: CPT

## 2024-01-04 PROCEDURE — NC001 PR NO CHARGE: Performed by: OBSTETRICS & GYNECOLOGY

## 2024-01-04 PROCEDURE — 86901 BLOOD TYPING SEROLOGIC RH(D): CPT

## 2024-01-04 RX ORDER — NIFEDIPINE 30 MG/1
60 TABLET, EXTENDED RELEASE ORAL DAILY
Status: DISCONTINUED | OUTPATIENT
Start: 2024-01-04 | End: 2024-01-08 | Stop reason: HOSPADM

## 2024-01-04 RX ADMIN — NIFEDIPINE 60 MG: 30 TABLET, EXTENDED RELEASE ORAL at 09:00

## 2024-01-04 RX ADMIN — Medication 1 TABLET: at 09:00

## 2024-01-04 NOTE — PLAN OF CARE
Problem: PAIN - ADULT  Goal: Verbalizes/displays adequate comfort level or baseline comfort level  Description: Interventions:  - Encourage patient to monitor pain and request assistance  - Assess pain using appropriate pain scale  - Administer analgesics based on type and severity of pain and evaluate response  - Implement non-pharmacological measures as appropriate and evaluate response  - Consider cultural and social influences on pain and pain management  - Notify physician/advanced practitioner if interventions unsuccessful or patient reports new pain  Outcome: Progressing     Problem: INFECTION - ADULT  Goal: Absence or prevention of progression during hospitalization  Description: INTERVENTIONS:  - Assess and monitor for signs and symptoms of infection  - Monitor lab/diagnostic results  - Monitor all insertion sites, i.e. indwelling lines, tubes, and drains  - Monitor endotracheal if appropriate and nasal secretions for changes in amount and color  - [place] appropriate cooling/warming therapies per order  - Administer medications as ordered  - Instruct and encourage patient and family to use good hand hygiene technique  - Identify and instruct in appropriate isolation precautions for identified infection/condition  Outcome: Progressing  Goal: Absence of fever/infection during neutropenic period  Description: INTERVENTIONS:  - Monitor WBC    Outcome: Progressing     Problem: SAFETY ADULT  Goal: Patient will remain free of falls  Description: INTERVENTIONS:  - Educate patient/family on patient safety including physical limitations  - Instruct patient to call for assistance with activity   - Consult OT/PT to assist with strengthening/mobility   - Keep Call bell within reach  - Keep bed low and locked with side rails adjusted as appropriate  - Keep care items and personal belongings within reach  - Initiate and maintain comfort rounds  - Make Fall Risk Sign visible to staff  - Apply yellow socks and bracelet  for high fall risk patients  - Consider moving patient to room near nurses station  Outcome: Progressing  Goal: Maintain or return to baseline ADL function  Description: INTERVENTIONS:  -  Assess patient's ability to carry out ADLs; assess patient's baseline for ADL function and identify physical deficits which impact ability to perform ADLs (bathing, care of mouth/teeth, toileting, grooming, dressing, etc.)  - Assess/evaluate cause of self-care deficits   - Assess range of motion  - Assess patient's mobility; develop plan if impaired  - Assess patient's need for assistive devices and provide as appropriate  - Encourage maximum independence but intervene and supervise when necessary  - Involve family in performance of ADLs  - Assess for home care needs following discharge   - Consider OT consult to assist with ADL evaluation and planning for discharge  - Provide patient education as appropriate  Outcome: Progressing  Goal: Maintains/Returns to pre admission functional level  Description: INTERVENTIONS:  - Perform AM-PAC 6 Click Basic Mobility/ Daily Activity assessment daily.  - Set and communicate daily mobility goal to care team and patient/family/caregiver.   - Collaborate with rehabilitation services on mobility goals if consulted  - Out of bed for toileting  - Record patient progress and toleration of activity level   Outcome: Progressing     Problem: DISCHARGE PLANNING  Goal: Discharge to home or other facility with appropriate resources  Description: INTERVENTIONS:  - Identify barriers to discharge w/patient and caregiver  - Arrange for needed discharge resources and transportation as appropriate  - Identify discharge learning needs (meds, wound care, etc.)  - Arrange for interpretive services to assist at discharge as needed  - Refer to Case Management Department for coordinating discharge planning if the patient needs post-hospital services based on physician/advanced practitioner order or complex needs  related to functional status, cognitive ability, or social support system  Outcome: Progressing     Problem: Knowledge Deficit  Goal: Patient/family/caregiver demonstrates understanding of disease process, treatment plan, medications, and discharge instructions  Description: Complete learning assessment and assess knowledge base.  Interventions:  - Provide teaching at level of understanding  - Provide teaching via preferred learning methods  Outcome: Progressing     Problem: ANTEPARTUM  Goal: Maintain pregnancy as long as maternal and/or fetal condition is stable  Description: INTERVENTIONS:  - Maternal surveillance  - Fetal surveillance  - Monitor uterine activity  - Medications as ordered  - Bedrest  Outcome: Progressing     Problem: NEUROSENSORY - ADULT  Goal: Achieves stable or improved neurological status  Description: INTERVENTIONS  - Monitor and report changes in neurological status  - Monitor vital signs such as temperature, blood pressure, glucose, and any other labs ordered   - Initiate measures to prevent increased intracranial pressure  - Monitor for seizure activity and implement precautions if appropriate      Outcome: Progressing  Goal: Remains free of injury related to seizures activity  Description: INTERVENTIONS  - Maintain airway, patient safety  and administer oxygen as ordered  - Monitor patient for seizure activity, document and report duration and description of seizure to physician/advanced practitioner  - If seizure occurs,  ensure patient safety during seizure  - Reorient patient post seizure  - Seizure pads on all 4 side rails  - Instruct patient/family to notify RN of any seizure activity including if an aura is experienced  - Instruct patient/family to call for assistance with activity based on nursing assessment  - Administer anti-seizure medications if ordered    Outcome: Progressing     Problem: CARDIOVASCULAR - ADULT  Goal: Maintains optimal cardiac output and hemodynamic  stability  Description: INTERVENTIONS:  - Monitor I/O, vital signs and rhythm  - Monitor for S/S and trends of decreased cardiac output  - Administer and titrate ordered vasoactive medications to optimize hemodynamic stability  - Assess quality of pulses, skin color and temperature  - Assess for signs of decreased coronary artery perfusion  - Instruct patient to report change in severity of symptoms  Outcome: Progressing

## 2024-01-04 NOTE — PROGRESS NOTES
Progress Note - Maternal Fetal Medicine   Monisha BREANA Liu 23 y.o. female MRN: [id number]  Unit/Bed#: -01 Encounter: [id number]  Admit date: 2023.  Today's date: 24    Assessment/Plan     Ms. Liu is a 23 y.o.  at 32w0d, Hospital Day: 5, admitted with preeclampsia with severe features.  By issue:    * Preeclampsia, severe, third trimester  Assessment & Plan  -: SRBPs treated with 20 mg IV labetalol, CBC wnl, CMP with Cr 0.8, AST and ALT wnl, P/C 0.4  -Mag infusion started  -F/u admission labs  -Perinatology consult, neonatology consult  -Betamethasone for fetal lung protection    31 weeks gestation of pregnancy  Assessment & Plan  -Admit to antepartum  -Vertex by TAUS  -GBS collected  -Continuous fetal monitoring while on magnesium  -Plan for formal US with perinatology        Subjective    Contractions: no  Loss of fluid: no  Vaginal bleeding: no  Fetal movement: yes    Pain: no  Headaches: no  Visual changes: no  Chest pain: no  Shortness of breath: no  Nausea: no  Vomiting/Diarrhea: no  Dysuria: no  Leg pain: no          Objective      Patient Vitals for the past 24 hrs:   BP Temp Temp src Pulse Resp SpO2   24 0430 148/95 -- -- -- -- --   24 0359 154/67 97.6 °F (36.4 °C) Oral 66 18 96 %   24 2345 135/88 97.7 °F (36.5 °C) Oral 75 18 99 %   24 2035 133/82 98.4 °F (36.9 °C) Oral 74 18 99 %   24 1653 131/86 98 °F (36.7 °C) Oral 66 18 96 %   24 1218 125/75 98.6 °F (37 °C) Oral 63 20 --   24 0826 147/93 98.4 °F (36.9 °C) Oral 63 20 98 %       Physical Exam  Constitutional:       General: She is not in acute distress.     Appearance: Normal appearance. She is not ill-appearing.   HENT:      Mouth/Throat:      Mouth: Mucous membranes are moist.   Eyes:      Extraocular Movements: Extraocular movements intact.   Cardiovascular:      Rate and Rhythm: Normal rate.   Pulmonary:      Effort: Pulmonary effort is normal.   Abdominal:      General: There is no  distension.      Palpations: Abdomen is soft.      Tenderness: There is no abdominal tenderness. There is no guarding.   Musculoskeletal:         General: No swelling or tenderness.      Right lower leg: No edema.      Left lower leg: No edema.   Skin:     General: Skin is warm and dry.      Coloration: Skin is not jaundiced or pale.   Neurological:      General: No focal deficit present.      Mental Status: She is alert.   Psychiatric:         Mood and Affect: Mood normal.         Behavior: Behavior normal.         Thought Content: Thought content normal.         Judgment: Judgment normal.         I/O       None            Invasive Devices       Peripheral Intravenous Line  Duration             Peripheral IV 01/04/24 Right;Ventral (anterior) Forearm <1 day                    Results from last 7 days   Lab Units 01/04/24  0534 01/01/24 1959 01/01/24 0605 12/31/23  1811   WBC Thousand/uL 10.06 8.46 7.71 6.73   NEUTROS ABS Thousands/µL  --  6.20  --  3.94   HEMOGLOBIN g/dL 10.9* 11.5 10.6* 10.5*   MCV fL 85 85 86 85   PLATELETS Thousands/uL 267 271 202 206     Results from last 7 days   Lab Units 01/01/24 2110 01/01/24 1959 01/01/24 0605 12/31/23  1811   POTASSIUM mmol/L  --  3.8 4.0 3.8   CHLORIDE mmol/L  --  104 106 108   CO2 mmol/L  --  18* 18* 20*   BUN mg/dL  --  7 6 7   CREATININE mg/dL  --  0.86 0.79 0.80   EGFR ml/min/1.73sq m  --  95 105 104   MAGNESIUM mg/dL 6.3*  --  6.0* 1.8*     Results from last 7 days   Lab Units 01/01/24 1959 01/01/24 0605 12/31/23  1811   AST U/L 25 21 20   ALT U/L 20 17 14     Results from last 7 days   Lab Units 01/04/24  0534 01/01/24 1959 01/01/24 0605 12/31/23  1811   PLATELETS Thousands/uL 267 271 202 206   INR   --   --   --  0.95   PROTIME seconds  --   --   --  13.3   PTT seconds  --   --   --  29         Results from last 7 days   Lab Units 12/31/23  1817   PROT/CREAT RATIO UR  0.40*             Results from last 7 days   Lab Units 12/31/23  7588   URINE CULTURE  No  Growth <1000 cfu/mL       Brief review of pertinent history:  Past Medical History:   Diagnosis Date    Asthma     Dysmenorrhea     Iron deficiency anemia     Menorrhagia     Migraine      History reviewed. No pertinent surgical history.  OB History    Para Term  AB Living   2 1 1 0 0 1   SAB IAB Ectopic Multiple Live Births   0 0 0 0 0      # Outcome Date GA Lbr Jethro/2nd Weight Sex Delivery Anes PTL Lv   2 Current            1 Term                Fetal data:  Nonstress test: date 24 Time: 0540 - 0610  Baseline:  130 bpm  Variability: moderate  Accelerations: present, 15x15  Decelerations: absent  Contractions: absent  Assessment: reactive  Plan: continue TID and PRN    MFM ultrasound report key findings:   Level I US from 1/3/2024 at 31w6d:  Cephalic presentation  Anterior placenta    AC             27.08 cm        31 weeks 1 day * (28%)  BPD             8.13 cm        32 weeks 5 days* (65%)  HC             29.26 cm        32 weeks 2 days* (24%)  Femur           5.90 cm        30 weeks 6 days* (13%)  HC/AC           1.08 [0.96 - 1.17]                 (63%)  FL/AC             22 [20 - 24]  FL/BPD            73 [71 - 87]  EFW [place] 4   1735 grams - 3 lbs 13 oz                 (22%)    AMNIOTIC FLUID   Q1: 5.4      Q2: 6.6      Q3: 2.7      Q4: 5.6  SHELIA Total = 20.3 cm  Amniotic Fluid: Normal    MEDS:   Medication Administration - last 24 hours from 2024 0625 to 2024 0625         Date/Time Order Dose Route Action Action by     2024 2144 EST docusate sodium (COLACE) capsule 100 mg 100 mg Oral Given Sonya Pope RN     2024 0849 EST NIFEdipine (PROCARDIA XL) 24 hr tablet 30 mg 30 mg Oral Given Susan Null RN     2024 0849 EST prenatal multivitamin tablet 1 tablet 1 tablet Oral Given Susan Null RN          Current Facility-Administered Medications   Medication Dose Route Frequency    acetaminophen (TYLENOL) tablet 650 mg  650 mg Oral Q4H PRN    calcium carbonate  (TUMS) chewable tablet 1,000 mg  1,000 mg Oral Daily PRN    docusate sodium (COLACE) capsule 100 mg  100 mg Oral BID PRN    labetalol (NORMODYNE) injection 40 mg  40 mg Intravenous Once    lactated ringers infusion  50 mL/hr Intravenous Continuous    NIFEdipine (PROCARDIA XL) 24 hr tablet 60 mg  60 mg Oral Daily    ondansetron (ZOFRAN) injection 4 mg  4 mg Intravenous Q8H PRN    prenatal multivitamin tablet 1 tablet  1 tablet Oral Daily    simethicone (MYLICON) chewable tablet 80 mg  80 mg Oral 4x Daily PRN            Oxana Mcdonough MD  OBGYN, PGY-3  1/4/2024  6:25 AM

## 2024-01-04 NOTE — PLAN OF CARE
Problem: PAIN - ADULT  Goal: Verbalizes/displays adequate comfort level or baseline comfort level  Description: Interventions:  - Encourage patient to monitor pain and request assistance  - Assess pain using appropriate pain scale  - Administer analgesics based on type and severity of pain and evaluate response  - Implement non-pharmacological measures as appropriate and evaluate response  - Consider cultural and social influences on pain and pain management  - Notify physician/advanced practitioner if interventions unsuccessful or patient reports new pain  Outcome: Progressing     Problem: INFECTION - ADULT  Goal: Absence or prevention of progression during hospitalization  Description: INTERVENTIONS:  - Assess and monitor for signs and symptoms of infection  - Monitor lab/diagnostic results  - Monitor all insertion sites, i.e. indwelling lines, tubes, and drains  - Monitor endotracheal if appropriate and nasal secretions for changes in amount and color  - [place] appropriate cooling/warming therapies per order  - Administer medications as ordered  - Instruct and encourage patient and family to use good hand hygiene technique  - Identify and instruct in appropriate isolation precautions for identified infection/condition  Outcome: Progressing  Goal: Absence of fever/infection during neutropenic period  Description: INTERVENTIONS:  - Monitor WBC    Outcome: Progressing     Problem: SAFETY ADULT  Goal: Patient will remain free of falls  Description: INTERVENTIONS:  - Educate patient/family on patient safety including physical limitations  - Instruct patient to call for assistance with activity   - Consult OT/PT to assist with strengthening/mobility   - Keep Call bell within reach  - Keep bed low and locked with side rails adjusted as appropriate  - Keep care items and personal belongings within reach  - Initiate and maintain comfort rounds  - Make Fall Risk Sign visible to staff  - Apply yellow socks and bracelet  for high fall risk patients  - Consider moving patient to room near nurses station  Outcome: Progressing  Goal: Maintain or return to baseline ADL function  Description: INTERVENTIONS:  -  Assess patient's ability to carry out ADLs; assess patient's baseline for ADL function and identify physical deficits which impact ability to perform ADLs (bathing, care of mouth/teeth, toileting, grooming, dressing, etc.)  - Assess/evaluate cause of self-care deficits   - Assess range of motion  - Assess patient's mobility; develop plan if impaired  - Assess patient's need for assistive devices and provide as appropriate  - Encourage maximum independence but intervene and supervise when necessary  - Involve family in performance of ADLs  - Assess for home care needs following discharge   - Consider OT consult to assist with ADL evaluation and planning for discharge  - Provide patient education as appropriate  Outcome: Progressing  Goal: Maintains/Returns to pre admission functional level  Description: INTERVENTIONS:  - Perform AM-PAC 6 Click Basic Mobility/ Daily Activity assessment daily.  - Set and communicate daily mobility goal to care team and patient/family/caregiver.   - Collaborate with rehabilitation services on mobility goals if consulted  - Out of bed for toileting  - Record patient progress and toleration of activity level   Outcome: Progressing     Problem: DISCHARGE PLANNING  Goal: Discharge to home or other facility with appropriate resources  Description: INTERVENTIONS:  - Identify barriers to discharge w/patient and caregiver  - Arrange for needed discharge resources and transportation as appropriate  - Identify discharge learning needs (meds, wound care, etc.)  - Arrange for interpretive services to assist at discharge as needed  - Refer to Case Management Department for coordinating discharge planning if the patient needs post-hospital services based on physician/advanced practitioner order or complex needs  related to functional status, cognitive ability, or social support system  Outcome: Progressing     Problem: Knowledge Deficit  Goal: Patient/family/caregiver demonstrates understanding of disease process, treatment plan, medications, and discharge instructions  Description: Complete learning assessment and assess knowledge base.  Interventions:  - Provide teaching at level of understanding  - Provide teaching via preferred learning methods  Outcome: Progressing     Problem: ANTEPARTUM  Goal: Maintain pregnancy as long as maternal and/or fetal condition is stable  Description: INTERVENTIONS:  - Maternal surveillance  - Fetal surveillance  - Monitor uterine activity  - Medications as ordered  - Bedrest  Outcome: Progressing     Problem: NEUROSENSORY - ADULT  Goal: Achieves stable or improved neurological status  Description: INTERVENTIONS  - Monitor and report changes in neurological status  - Monitor vital signs such as temperature, blood pressure, glucose, and any other labs ordered   - Initiate measures to prevent increased intracranial pressure  - Monitor for seizure activity and implement precautions if appropriate      Outcome: Progressing  Goal: Remains free of injury related to seizures activity  Description: INTERVENTIONS  - Maintain airway, patient safety  and administer oxygen as ordered  - Monitor patient for seizure activity, document and report duration and description of seizure to physician/advanced practitioner  - If seizure occurs,  ensure patient safety during seizure  - Reorient patient post seizure  - Seizure pads on all 4 side rails  - Instruct patient/family to notify RN of any seizure activity including if an aura is experienced  - Instruct patient/family to call for assistance with activity based on nursing assessment  - Administer anti-seizure medications if ordered    Outcome: Progressing     Problem: CARDIOVASCULAR - ADULT  Goal: Maintains optimal cardiac output and hemodynamic  stability  Description: INTERVENTIONS:  - Monitor I/O, vital signs and rhythm  - Monitor for S/S and trends of decreased cardiac output  - Administer and titrate ordered vasoactive medications to optimize hemodynamic stability  - Assess quality of pulses, skin color and temperature  - Assess for signs of decreased coronary artery perfusion  - Instruct patient to report change in severity of symptoms  Outcome: Progressing

## 2024-01-05 ENCOUNTER — ANESTHESIA EVENT (INPATIENT)
Dept: ANESTHESIOLOGY | Facility: HOSPITAL | Age: 24
End: 2024-01-05
Payer: COMMERCIAL

## 2024-01-05 ENCOUNTER — ANESTHESIA (INPATIENT)
Dept: ANESTHESIOLOGY | Facility: HOSPITAL | Age: 24
End: 2024-01-05
Payer: COMMERCIAL

## 2024-01-05 PROBLEM — Z3A.32 32 WEEKS GESTATION OF PREGNANCY: Status: RESOLVED | Noted: 2024-01-04 | Resolved: 2024-01-05

## 2024-01-05 PROBLEM — Z3A.32 32 WEEKS GESTATION OF PREGNANCY: Status: ACTIVE | Noted: 2023-12-31

## 2024-01-05 LAB
ALBUMIN SERPL BCP-MCNC: 3.3 G/DL (ref 3.5–5)
ALBUMIN SERPL BCP-MCNC: 3.6 G/DL (ref 3.5–5)
ALBUMIN SERPL BCP-MCNC: 3.6 G/DL (ref 3.5–5)
ALP SERPL-CCNC: 267 U/L (ref 34–104)
ALP SERPL-CCNC: 279 U/L (ref 34–104)
ALP SERPL-CCNC: 288 U/L (ref 34–104)
ALT SERPL W P-5'-P-CCNC: 101 U/L (ref 7–52)
ALT SERPL W P-5'-P-CCNC: 164 U/L (ref 7–52)
ALT SERPL W P-5'-P-CCNC: 310 U/L (ref 7–52)
ANION GAP SERPL CALCULATED.3IONS-SCNC: 7 MMOL/L
AST SERPL W P-5'-P-CCNC: 144 U/L (ref 13–39)
AST SERPL W P-5'-P-CCNC: 289 U/L (ref 13–39)
AST SERPL W P-5'-P-CCNC: 84 U/L (ref 13–39)
BILIRUB SERPL-MCNC: 0.18 MG/DL (ref 0.2–1)
BILIRUB SERPL-MCNC: 0.18 MG/DL (ref 0.2–1)
BILIRUB SERPL-MCNC: 0.19 MG/DL (ref 0.2–1)
BUN SERPL-MCNC: 13 MG/DL (ref 5–25)
CALCIUM ALBUM COR SERPL-MCNC: 9.1 MG/DL (ref 8.3–10.1)
CALCIUM SERPL-MCNC: 8.3 MG/DL (ref 8.4–10.2)
CALCIUM SERPL-MCNC: 8.5 MG/DL (ref 8.4–10.2)
CALCIUM SERPL-MCNC: 9.1 MG/DL (ref 8.4–10.2)
CHLORIDE SERPL-SCNC: 103 MMOL/L (ref 96–108)
CHLORIDE SERPL-SCNC: 104 MMOL/L (ref 96–108)
CHLORIDE SERPL-SCNC: 107 MMOL/L (ref 96–108)
CO2 SERPL-SCNC: 19 MMOL/L (ref 21–32)
CO2 SERPL-SCNC: 22 MMOL/L (ref 21–32)
CO2 SERPL-SCNC: 23 MMOL/L (ref 21–32)
CREAT SERPL-MCNC: 0.81 MG/DL (ref 0.6–1.3)
CREAT SERPL-MCNC: 0.88 MG/DL (ref 0.6–1.3)
CREAT SERPL-MCNC: 0.9 MG/DL (ref 0.6–1.3)
ERYTHROCYTE [DISTWIDTH] IN BLOOD BY AUTOMATED COUNT: 14.6 % (ref 11.6–15.1)
ERYTHROCYTE [DISTWIDTH] IN BLOOD BY AUTOMATED COUNT: 14.6 % (ref 11.6–15.1)
ERYTHROCYTE [DISTWIDTH] IN BLOOD BY AUTOMATED COUNT: 14.8 % (ref 11.6–15.1)
GFR SERPL CREATININE-BSD FRML MDRD: 102 ML/MIN/1.73SQ M
GFR SERPL CREATININE-BSD FRML MDRD: 90 ML/MIN/1.73SQ M
GFR SERPL CREATININE-BSD FRML MDRD: 92 ML/MIN/1.73SQ M
GLUCOSE SERPL-MCNC: 123 MG/DL (ref 65–140)
GLUCOSE SERPL-MCNC: 79 MG/DL (ref 65–140)
GLUCOSE SERPL-MCNC: 91 MG/DL (ref 65–140)
HCT VFR BLD AUTO: 36.4 % (ref 34.8–46.1)
HCT VFR BLD AUTO: 38.7 % (ref 34.8–46.1)
HCT VFR BLD AUTO: 40.6 % (ref 34.8–46.1)
HGB BLD-MCNC: 11.5 G/DL (ref 11.5–15.4)
HGB BLD-MCNC: 12.2 G/DL (ref 11.5–15.4)
HGB BLD-MCNC: 12.6 G/DL (ref 11.5–15.4)
MAGNESIUM SERPL-MCNC: 5.6 MG/DL (ref 1.9–2.7)
MAGNESIUM SERPL-MCNC: 7 MG/DL (ref 1.9–2.7)
MCH RBC QN AUTO: 26.1 PG (ref 26.8–34.3)
MCH RBC QN AUTO: 26.6 PG (ref 26.8–34.3)
MCH RBC QN AUTO: 27 PG (ref 26.8–34.3)
MCHC RBC AUTO-ENTMCNC: 31 G/DL (ref 31.4–37.4)
MCHC RBC AUTO-ENTMCNC: 31.5 G/DL (ref 31.4–37.4)
MCHC RBC AUTO-ENTMCNC: 31.6 G/DL (ref 31.4–37.4)
MCV RBC AUTO: 84 FL (ref 82–98)
MCV RBC AUTO: 85 FL (ref 82–98)
MCV RBC AUTO: 85 FL (ref 82–98)
PLATELET # BLD AUTO: 300 THOUSANDS/UL (ref 149–390)
PLATELET # BLD AUTO: 318 THOUSANDS/UL (ref 149–390)
PLATELET # BLD AUTO: 318 THOUSANDS/UL (ref 149–390)
PMV BLD AUTO: 10.4 FL (ref 8.9–12.7)
PMV BLD AUTO: 10.9 FL (ref 8.9–12.7)
PMV BLD AUTO: 11 FL (ref 8.9–12.7)
POTASSIUM SERPL-SCNC: 4.3 MMOL/L (ref 3.5–5.3)
POTASSIUM SERPL-SCNC: 4.5 MMOL/L (ref 3.5–5.3)
POTASSIUM SERPL-SCNC: 4.6 MMOL/L (ref 3.5–5.3)
PROT SERPL-MCNC: 6.3 G/DL (ref 6.4–8.4)
PROT SERPL-MCNC: 6.9 G/DL (ref 6.4–8.4)
PROT SERPL-MCNC: 6.9 G/DL (ref 6.4–8.4)
RBC # BLD AUTO: 4.26 MILLION/UL (ref 3.81–5.12)
RBC # BLD AUTO: 4.58 MILLION/UL (ref 3.81–5.12)
RBC # BLD AUTO: 4.83 MILLION/UL (ref 3.81–5.12)
SODIUM SERPL-SCNC: 133 MMOL/L (ref 135–147)
WBC # BLD AUTO: 10.91 THOUSAND/UL (ref 4.31–10.16)
WBC # BLD AUTO: 11.66 THOUSAND/UL (ref 4.31–10.16)
WBC # BLD AUTO: 11.81 THOUSAND/UL (ref 4.31–10.16)

## 2024-01-05 PROCEDURE — 80053 COMPREHEN METABOLIC PANEL: CPT

## 2024-01-05 PROCEDURE — 85027 COMPLETE CBC AUTOMATED: CPT

## 2024-01-05 PROCEDURE — 10907ZC DRAINAGE OF AMNIOTIC FLUID, THERAPEUTIC FROM PRODUCTS OF CONCEPTION, VIA NATURAL OR ARTIFICIAL OPENING: ICD-10-PCS | Performed by: OBSTETRICS & GYNECOLOGY

## 2024-01-05 PROCEDURE — 59025 FETAL NON-STRESS TEST: CPT | Performed by: OBSTETRICS & GYNECOLOGY

## 2024-01-05 PROCEDURE — 99232 SBSQ HOSP IP/OBS MODERATE 35: CPT | Performed by: OBSTETRICS & GYNECOLOGY

## 2024-01-05 PROCEDURE — 83735 ASSAY OF MAGNESIUM: CPT

## 2024-01-05 PROCEDURE — 4A1HXCZ MONITORING OF PRODUCTS OF CONCEPTION, CARDIAC RATE, EXTERNAL APPROACH: ICD-10-PCS | Performed by: OBSTETRICS & GYNECOLOGY

## 2024-01-05 RX ORDER — CALCIUM GLUCONATE 94 MG/ML
1 INJECTION, SOLUTION INTRAVENOUS ONCE AS NEEDED
Status: DISCONTINUED | OUTPATIENT
Start: 2024-01-05 | End: 2024-01-07

## 2024-01-05 RX ORDER — ROPIVACAINE HYDROCHLORIDE 2 MG/ML
INJECTION, SOLUTION EPIDURAL; INFILTRATION; PERINEURAL CONTINUOUS PRN
Status: DISCONTINUED | OUTPATIENT
Start: 2024-01-05 | End: 2024-01-06 | Stop reason: HOSPADM

## 2024-01-05 RX ORDER — OXYTOCIN/RINGER'S LACTATE 30/500 ML
1-30 PLASTIC BAG, INJECTION (ML) INTRAVENOUS
Status: DISCONTINUED | OUTPATIENT
Start: 2024-01-05 | End: 2024-01-06

## 2024-01-05 RX ORDER — MAGNESIUM SULFATE HEPTAHYDRATE 40 MG/ML
1 INJECTION, SOLUTION INTRAVENOUS CONTINUOUS
Status: DISCONTINUED | OUTPATIENT
Start: 2024-01-05 | End: 2024-01-07

## 2024-01-05 RX ORDER — LIDOCAINE HYDROCHLORIDE AND EPINEPHRINE 15; 5 MG/ML; UG/ML
INJECTION, SOLUTION EPIDURAL AS NEEDED
Status: DISCONTINUED | OUTPATIENT
Start: 2024-01-05 | End: 2024-01-06 | Stop reason: HOSPADM

## 2024-01-05 RX ORDER — MAGNESIUM SULFATE HEPTAHYDRATE 40 MG/ML
4 INJECTION, SOLUTION INTRAVENOUS ONCE
Status: COMPLETED | OUTPATIENT
Start: 2024-01-05 | End: 2024-01-05

## 2024-01-05 RX ORDER — SODIUM CHLORIDE, SODIUM LACTATE, POTASSIUM CHLORIDE, CALCIUM CHLORIDE 600; 310; 30; 20 MG/100ML; MG/100ML; MG/100ML; MG/100ML
50 INJECTION, SOLUTION INTRAVENOUS CONTINUOUS
Status: DISCONTINUED | OUTPATIENT
Start: 2024-01-05 | End: 2024-01-06

## 2024-01-05 RX ORDER — MAGNESIUM SULFATE HEPTAHYDRATE 40 MG/ML
2 INJECTION, SOLUTION INTRAVENOUS ONCE
Status: COMPLETED | OUTPATIENT
Start: 2024-01-05 | End: 2024-01-05

## 2024-01-05 RX ORDER — ACETAMINOPHEN 325 MG/1
650 TABLET ORAL EVERY 6 HOURS PRN
Status: DISCONTINUED | OUTPATIENT
Start: 2024-01-05 | End: 2024-01-06

## 2024-01-05 RX ADMIN — Medication 1 TABLET: at 08:44

## 2024-01-05 RX ADMIN — MAGNESIUM SULFATE HEPTAHYDRATE 2 G: 40 INJECTION, SOLUTION INTRAVENOUS at 10:41

## 2024-01-05 RX ADMIN — Medication 25 MCG: at 11:58

## 2024-01-05 RX ADMIN — ROPIVACAINE HYDROCHLORIDE 10 ML/HR: 2 INJECTION EPIDURAL; INFILTRATION; PERINEURAL at 19:41

## 2024-01-05 RX ADMIN — SODIUM CHLORIDE, SODIUM LACTATE, POTASSIUM CHLORIDE, AND CALCIUM CHLORIDE 50 ML/HR: .6; .31; .03; .02 INJECTION, SOLUTION INTRAVENOUS at 22:16

## 2024-01-05 RX ADMIN — MAGNESIUM SULFATE IN WATER 2 G/HR: 20 INJECTION, SOLUTION INTRAVENOUS at 11:01

## 2024-01-05 RX ADMIN — MAGNESIUM SULFATE IN WATER 4 G: 4 INJECTION, SOLUTION INTRAVENOUS at 10:09

## 2024-01-05 RX ADMIN — CALCIUM CARBONATE 1000 MG: 500 TABLET, CHEWABLE ORAL at 06:28

## 2024-01-05 RX ADMIN — SODIUM CHLORIDE, SODIUM LACTATE, POTASSIUM CHLORIDE, AND CALCIUM CHLORIDE 50 ML/HR: .6; .31; .03; .02 INJECTION, SOLUTION INTRAVENOUS at 10:00

## 2024-01-05 RX ADMIN — MAGNESIUM SULFATE IN WATER 2 G/HR: 20 INJECTION, SOLUTION INTRAVENOUS at 21:24

## 2024-01-05 RX ADMIN — Medication 2 MILLI-UNITS/MIN: at 16:28

## 2024-01-05 RX ADMIN — ROPIVACAINE HYDROCHLORIDE: 2 INJECTION, SOLUTION EPIDURAL; INFILTRATION at 20:20

## 2024-01-05 RX ADMIN — LIDOCAINE HYDROCHLORIDE AND EPINEPHRINE 5 ML: 15; 5 INJECTION, SOLUTION EPIDURAL at 19:38

## 2024-01-05 RX ADMIN — NIFEDIPINE 60 MG: 30 TABLET, EXTENDED RELEASE ORAL at 08:44

## 2024-01-05 RX ADMIN — ACETAMINOPHEN 650 MG: 325 TABLET, FILM COATED ORAL at 20:17

## 2024-01-05 NOTE — OB LABOR/OXYTOCIN SAFETY PROGRESS
Labor Progress Note - Monisha Liu 23 y.o. female MRN: [id number]    Unit/Bed#: -01 Encounter: [id number]       Contraction Frequency (minutes): 2 and irritability  Contraction Intensity: Mild  Uterine Activity Characteristics: Irritability  Cervical Dilation: 1        Cervical Effacement: 50  Fetal Station: Ballotable  Baseline Rate (FHR): 130 bpm  Fetal Heart Rate (FHT): 132 BPM  FHR Category: 1               Vital Signs:   Vitals:    01/05/24 0900   BP: 151/98   Pulse:    Resp:    Temp:    SpO2:        Notes/comments:   Patient resting comfortably.  FHT category 1 with no contractions on toco.  SVE 1/50/-4, posterior, and firm.  Hahn balloon placed as outlined below, and vaginal Cytotec placed.    PROCEDURE:  HAHN BALLOON PLACEMENT  A 24 F hahn with a 30 mL balloon was selected.  A sterile cervical exam was performed, and the cervix was located. The hahn balloon was introduced over sterile gloved hands and advanced through the cervix beyond the internal cervical os. A small amount amount of sterile saline solution was instilled into the balloon to confirm placement.  Placement was confirmed to be beyond the internal cervical os.  A total of 60 mL of sterile saline solution was used to fill the balloon.  Patient tolerated the procedure well.  Instructions left with nurse to place hahn to tension (either by taping the end of the hahn catheter to the patient's leg or by attaching a 1 L bag of IV fluid to the end of the hahn catheter.) and notify resident and/or attending when hahn balloon becomes dislodged.    Oxana Mcdonough MD 1/5/2024 12:10 PM

## 2024-01-05 NOTE — OB LABOR/OXYTOCIN SAFETY PROGRESS
Labor Progress Note - Monisha Liu 23 y.o. female MRN: [id number]    Unit/Bed#: -01 Encounter: [id number]       Contraction Frequency (minutes): 2-6  Contraction Intensity: Mild  Uterine Activity Characteristics: Irregular  Cervical Dilation: 3-4        Cervical Effacement: 60  Fetal Station: -3  Baseline Rate (FHR): 130 bpm  Fetal Heart Rate (FHT): 125 BPM  FHR Category: 1               Vital Signs:   Vitals:    01/05/24 1444   BP: 145/99   Pulse: 73   Resp:    Temp:    SpO2:        Notes/comments:   Notified by RN that headley balloon is dislodged.  SVE 3.5/60/-3, posterior, medium consistency.  FHT category 1 with contractions every 2-5 min.  Will start Pitocin 4 hr s/p Cytotec (at 1600).        Oxana Mcdonough MD 1/5/2024 3:00 PM

## 2024-01-05 NOTE — PROGRESS NOTES
Progress Note - Maternal Fetal Medicine   Monisha Liu 23 y.o. female MRN: [id number]  Unit/Bed#: -01 Encounter: [id number]  Admit date: 2023.  Today's date: 24    Assessment/Plan     Ms. Liu is a 23 y.o.  at 32w1d, Hospital Day: 6, admitted with preeclampsia with severe features, now with worsening liver enzymes necessitating IOL today.  By issue:    * Preeclampsia, severe, third trimester  Assessment & Plan  S/p Magnesium x24 hr for both fetal neuroprotection and maternal seizure prophylaxis.  S/p Betamethasone  -  for fetal lung maturation.  CBC & CMP wnl on admit  AST/ALT 49/45 on repeat  --> 84/101 on   UPC 0.40  23: Labetalol 20/40 mg IV on admission  24: started Procardia XL 30 mg qd  24: increased to Procardia XL 60 mg qd  Systolic (24hrs), Av , Min:128 , Max:156   Diastolic (24hrs), Av, Min:72, Max:99    Monitor BPs  Monitor for signs/symptoms of worsening preeclampsia  CBC & CMP qd (trend & closely monitor newly rising AST/ALT)  Diet: regular  Fetal Monitoring: NST TID  Recommend IOL today for worsening liver enzymes in the setting of pre-existing preE w/ SF  Once in a labor room will check & start induction (likely to start w/ Cytotec & Gomez balloon)    Nonimmune to hepatitis B virus  Assessment & Plan  Hep B vaccine series initiated inpatient (series to be completed outpatient with PCP in [place])    32 weeks gestation of pregnancy  Assessment & Plan  Overview:  Received prenatal care in [facility] (admitted while traveling here to see family)  Prenatal labs notable for hep B non-immune status  Tdap vaccine: patient declined  Contraception: POP bridge to outpatient Nexplanon ([employer] [place] insurance)  Birth plan: IOL previously scheduled for 24 at 6 am at 34w0d  Delivery consent: signed 23  Induction consent: signed 24    Plan:  Daily PNV  Routine obstetric care with above mentioned  additions/exceptions  Postpartum contraception: POP bridge to Nexplanon outpatient ([employer] [facility])             Subjective    Contractions: no  Loss of fluid: no  Vaginal bleeding: no  Fetal movement: yes    Pain: no  Headaches: no  Visual changes: no  Chest pain: no  Shortness of breath: no  Nausea: no  Vomiting/Diarrhea: no  Dysuria: no  Leg pain: no          Objective      Patient Vitals for the past 24 hrs:   BP Temp Temp src Pulse Resp SpO2   01/05/24 0359 134/84 98 °F (36.7 °C) Oral 72 16 98 %   01/04/24 2359 128/72 -- -- 68 -- --   01/04/24 2348 155/89 97.7 °F (36.5 °C) Oral 72 20 99 %   01/04/24 2029 141/90 -- -- 74 -- --   01/04/24 2012 156/99 98.2 °F (36.8 °C) Oral 75 18 --   01/04/24 1629 143/88 98 °F (36.7 °C) Oral 68 18 --   01/04/24 1153 133/74 97.6 °F (36.4 °C) Oral 72 20 98 %   01/04/24 0853 137/83 97.6 °F (36.4 °C) Oral 70 18 96 %       Physical Exam  Constitutional:       General: She is not in acute distress.     Appearance: Normal appearance. She is not ill-appearing.   HENT:      Mouth/Throat:      Mouth: Mucous membranes are moist.   Eyes:      Extraocular Movements: Extraocular movements intact.   Cardiovascular:      Rate and Rhythm: Normal rate.   Pulmonary:      Effort: Pulmonary effort is normal.   Abdominal:      General: There is no distension.      Palpations: Abdomen is soft.      Tenderness: There is no abdominal tenderness. There is no guarding.   Musculoskeletal:         General: No swelling or tenderness.      Right lower leg: No edema.      Left lower leg: No edema.   Skin:     General: Skin is warm and dry.      Coloration: Skin is not jaundiced or pale.   Neurological:      General: No focal deficit present.      Mental Status: She is alert.   Psychiatric:         Mood and Affect: Mood normal.         Behavior: Behavior normal.         Thought Content: Thought content normal.         Judgment: Judgment normal.     TAUS confirms cephalic presentation today    I/O       None             Invasive Devices       Peripheral Intravenous Line  Duration             Peripheral IV 24 Right;Ventral (anterior) Forearm 1 day                    Results from last 7 days   Lab Units 24  1811   WBC Thousand/uL 11.81* 10.06 8.46 7.71 6.73   NEUTROS ABS Thousands/µL  --   --  6.20  --  3.94   HEMOGLOBIN g/dL 11.5 10.9* 11.5 10.6* 10.5*   MCV fL 85 85 85 86 85   PLATELETS Thousands/uL 300 267 271 202 206     Results from last 7 days   Lab Units 24  1811   POTASSIUM mmol/L 4.3 4.8  --  3.8 4.0 3.8   CHLORIDE mmol/L 107 105  --  104 106 108   CO2 mmol/L 19* 21  --  18* 18* 20*   BUN mg/dL 13 15  --  7 6 7   CREATININE mg/dL 0.81 0.74  --  0.86 0.79 0.80   EGFR ml/min/1.73sq m 102 114  --  95 105 104   MAGNESIUM mg/dL  --   --  6.3*  --  6.0* 1.8*     Results from last 7 days   Lab Units 24  1811   AST U/L 84* 49* 25 21 20   ALT U/L 101* 45 20 17 14     Results from last 7 days   Lab Units 24  1811   PLATELETS Thousands/uL 300 267 271 202 206   INR   --   --   --   --  0.95   PROTIME seconds  --   --   --   --  13.3   PTT seconds  --   --   --   --  29         Results from last 7 days   Lab Units 23  1817   PROT/CREAT RATIO UR  0.40*             Results from last 7 days   Lab Units 23  1803   URINE CULTURE  No Growth <1000 cfu/mL       Brief review of pertinent history:  Past Medical History:   Diagnosis Date    Asthma     Dysmenorrhea     Iron deficiency anemia     Menorrhagia     Migraine      History reviewed. No pertinent surgical history.  OB History    Para Term  AB Living   2 1 1 0 0 1   SAB IAB Ectopic Multiple Live Births   0 0 0 0 0      # Outcome Date GA Lbr Jethro/2nd Weight Sex Delivery Anes  PTL Lv   2 Current            1 Term                Fetal data:  Nonstress test: date 01/05/24 Time: 0535 - ongoing  Baseline:  135 bpm  Variability: moderate  Accelerations: present, 15x15  Decelerations:  single isolated deceleration with derrell of 110 bpm at 0550  Contractions: absent  Assessment:  ongoing (non-reactive due to deceleration)  Plan:  am NST ongoing; if reactive, return to NST TID      MFM ultrasound report key findings:   Level I US from 1/3/2024 at 31w6d:  Cephalic presentation  Anterior placenta     AC             27.08 cm        31 weeks 1 day * (28%)  BPD             8.13 cm        32 weeks 5 days* (65%)  HC             29.26 cm        32 weeks 2 days* (24%)  Femur           5.90 cm        30 weeks 6 days* (13%)  HC/AC           1.08 [0.96 - 1.17]                 (63%)  FL/AC             22 [20 - 24]  FL/BPD            73 [71 - 87]  EFW [place] 4   1735 grams - 3 lbs 13 oz                 (22%)     AMNIOTIC FLUID   Q1: 5.4      Q2: 6.6      Q3: 2.7      Q4: 5.6  SHELIA Total = 20.3 cm  Amniotic Fluid: Normal    MEDS:   Medication Administration - last 24 hours from 01/04/2024 0649 to 01/05/2024 0649         Date/Time Order Dose Route Action Action by     01/05/2024 0628 EST calcium carbonate (TUMS) chewable tablet 1,000 mg 1,000 mg Oral Given Sonya Pope RN     01/04/2024 0900 EST prenatal multivitamin tablet 1 tablet 1 tablet Oral Given Susan Null RN     01/04/2024 0900 EST NIFEdipine (PROCARDIA XL) 24 hr tablet 60 mg 60 mg Oral Given Susna Null RN          Current Facility-Administered Medications   Medication Dose Route Frequency    acetaminophen (TYLENOL) tablet 650 mg  650 mg Oral Q4H PRN    calcium carbonate (TUMS) chewable tablet 1,000 mg  1,000 mg Oral Daily PRN    docusate sodium (COLACE) capsule 100 mg  100 mg Oral BID PRN    NIFEdipine (PROCARDIA XL) 24 hr tablet 60 mg  60 mg Oral Daily    ondansetron (ZOFRAN) injection 4 mg  4 mg Intravenous Q8H PRN    prenatal multivitamin  tablet 1 tablet  1 tablet Oral Daily    simethicone (MYLICON) chewable tablet 80 mg  80 mg Oral 4x Daily PRN            Oxana Mcdonough MD  OBGYN, PGY-3  1/5/2024  6:49 AM

## 2024-01-05 NOTE — PLAN OF CARE
Problem: PAIN - ADULT  Goal: Verbalizes/displays adequate comfort level or baseline comfort level  Description: Interventions:  - Encourage patient to monitor pain and request assistance  - Assess pain using appropriate pain scale  - Administer analgesics based on type and severity of pain and evaluate response  - Implement non-pharmacological measures as appropriate and evaluate response  - Consider cultural and social influences on pain and pain management  - Notify physician/advanced practitioner if interventions unsuccessful or patient reports new pain  Outcome: Progressing     Problem: INFECTION - ADULT  Goal: Absence or prevention of progression during hospitalization  Description: INTERVENTIONS:  - Assess and monitor for signs and symptoms of infection  - Monitor lab/diagnostic results  - Monitor all insertion sites, i.e. indwelling lines, tubes, and drains  - Monitor endotracheal if appropriate and nasal secretions for changes in amount and color  - [place] appropriate cooling/warming therapies per order  - Administer medications as ordered  - Instruct and encourage patient and family to use good hand hygiene technique  - Identify and instruct in appropriate isolation precautions for identified infection/condition  Outcome: Progressing  Goal: Absence of fever/infection during neutropenic period  Description: INTERVENTIONS:  - Monitor WBC    Outcome: Progressing     Problem: SAFETY ADULT  Goal: Patient will remain free of falls  Description: INTERVENTIONS:  - Educate patient/family on patient safety including physical limitations  - Instruct patient to call for assistance with activity   - Consult OT/PT to assist with strengthening/mobility   - Keep Call bell within reach  - Keep bed low and locked with side rails adjusted as appropriate  - Keep care items and personal belongings within reach  - Initiate and maintain comfort rounds  - Make Fall Risk Sign visible to staff  - Apply yellow socks and bracelet  for high fall risk patients  - Consider moving patient to room near nurses station  Outcome: Progressing  Goal: Maintain or return to baseline ADL function  Description: INTERVENTIONS:  -  Assess patient's ability to carry out ADLs; assess patient's baseline for ADL function and identify physical deficits which impact ability to perform ADLs (bathing, care of mouth/teeth, toileting, grooming, dressing, etc.)  - Assess/evaluate cause of self-care deficits   - Assess range of motion  - Assess patient's mobility; develop plan if impaired  - Assess patient's need for assistive devices and provide as appropriate  - Encourage maximum independence but intervene and supervise when necessary  - Involve family in performance of ADLs  - Assess for home care needs following discharge   - Consider OT consult to assist with ADL evaluation and planning for discharge  - Provide patient education as appropriate  Outcome: Progressing  Goal: Maintains/Returns to pre admission functional level  Description: INTERVENTIONS:  - Perform AM-PAC 6 Click Basic Mobility/ Daily Activity assessment daily.  - Set and communicate daily mobility goal to care team and patient/family/caregiver.   - Collaborate with rehabilitation services on mobility goals if consulted  - Out of bed for toileting  - Record patient progress and toleration of activity level   Outcome: Progressing     Problem: DISCHARGE PLANNING  Goal: Discharge to home or other facility with appropriate resources  Description: INTERVENTIONS:  - Identify barriers to discharge w/patient and caregiver  - Arrange for needed discharge resources and transportation as appropriate  - Identify discharge learning needs (meds, wound care, etc.)  - Arrange for interpretive services to assist at discharge as needed  - Refer to Case Management Department for coordinating discharge planning if the patient needs post-hospital services based on physician/advanced practitioner order or complex needs  related to functional status, cognitive ability, or social support system  Outcome: Progressing     Problem: Knowledge Deficit  Goal: Patient/family/caregiver demonstrates understanding of disease process, treatment plan, medications, and discharge instructions  Description: Complete learning assessment and assess knowledge base.  Interventions:  - Provide teaching at level of understanding  - Provide teaching via preferred learning methods  Outcome: Progressing  Goal: Verbalizes understanding of labor plan  Description: Assess patient/family/caregiver's baseline knowledge level and ability to understand information.  Provide education via patient/family/caregiver's preferred learning method at appropriate level of understanding.     1. Provide teaching at level of understanding.  2. Provide teaching via preferred learning method(s).  Outcome: Progressing     Problem: ANTEPARTUM  Goal: Maintain pregnancy as long as maternal and/or fetal condition is stable  Description: INTERVENTIONS:  - Maternal surveillance  - Fetal surveillance  - Monitor uterine activity  - Medications as ordered  - Bedrest  Outcome: Progressing     Problem: NEUROSENSORY - ADULT  Goal: Achieves stable or improved neurological status  Description: INTERVENTIONS  - Monitor and report changes in neurological status  - Monitor vital signs such as temperature, blood pressure, glucose, and any other labs ordered   - Initiate measures to prevent increased intracranial pressure  - Monitor for seizure activity and implement precautions if appropriate      Outcome: Progressing  Goal: Remains free of injury related to seizures activity  Description: INTERVENTIONS  - Maintain airway, patient safety  and administer oxygen as ordered  - Monitor patient for seizure activity, document and report duration and description of seizure to physician/advanced practitioner  - If seizure occurs,  ensure patient safety during seizure  - Reorient patient post seizure  -  Seizure pads on all 4 side rails  - Instruct patient/family to notify RN of any seizure activity including if an aura is experienced  - Instruct patient/family to call for assistance with activity based on nursing assessment  - Administer anti-seizure medications if ordered    Outcome: Progressing     Problem: CARDIOVASCULAR - ADULT  Goal: Maintains optimal cardiac output and hemodynamic stability  Description: INTERVENTIONS:  - Monitor I/O, vital signs and rhythm  - Monitor for S/S and trends of decreased cardiac output  - Administer and titrate ordered vasoactive medications to optimize hemodynamic stability  - Assess quality of pulses, skin color and temperature  - Assess for signs of decreased coronary artery perfusion  - Instruct patient to report change in severity of symptoms  Outcome: Progressing     Problem: Labor & Delivery  Goal: Manages discomfort  Description: Assess and monitor for signs and symptoms of discomfort.  Assess patient's pain level regularly and per hospital policy.  Administer medications as ordered. Support use of nonpharmacological methods to help control pain such as distraction, imagery, relaxation, and application of heat and cold.  Collaborate with interdisciplinary team and patient to determine appropriate pain management plan.    1. Include patient in decisions related to comfort.  2. Offer non-pharmacological pain management interventions.  3. Report ineffective pain management to physician.  Outcome: Progressing  Goal: Patient vital signs are stable  Description: 1. Assess vital signs - vaginal delivery.  Outcome: Progressing

## 2024-01-05 NOTE — PLAN OF CARE
Problem: PAIN - ADULT  Goal: Verbalizes/displays adequate comfort level or baseline comfort level  Description: Interventions:  - Encourage patient to monitor pain and request assistance  - Assess pain using appropriate pain scale  - Administer analgesics based on type and severity of pain and evaluate response  - Implement non-pharmacological measures as appropriate and evaluate response  - Consider cultural and social influences on pain and pain management  - Notify physician/advanced practitioner if interventions unsuccessful or patient reports new pain  Outcome: Progressing     Problem: INFECTION - ADULT  Goal: Absence or prevention of progression during hospitalization  Description: INTERVENTIONS:  - Assess and monitor for signs and symptoms of infection  - Monitor lab/diagnostic results  - Monitor all insertion sites, i.e. indwelling lines, tubes, and drains  - Monitor endotracheal if appropriate and nasal secretions for changes in amount and color  - [place] appropriate cooling/warming therapies per order  - Administer medications as ordered  - Instruct and encourage patient and family to use good hand hygiene technique  - Identify and instruct in appropriate isolation precautions for identified infection/condition  Outcome: Progressing  Goal: Absence of fever/infection during neutropenic period  Description: INTERVENTIONS:  - Monitor WBC    Outcome: Progressing     Problem: SAFETY ADULT  Goal: Patient will remain free of falls  Description: INTERVENTIONS:  - Educate patient/family on patient safety including physical limitations  - Instruct patient to call for assistance with activity   - Consult OT/PT to assist with strengthening/mobility   - Keep Call bell within reach  - Keep bed low and locked with side rails adjusted as appropriate  - Keep care items and personal belongings within reach  - Initiate and maintain comfort rounds  - Make Fall Risk Sign visible to staff  - Apply yellow socks and bracelet  for high fall risk patients  - Consider moving patient to room near nurses station  Outcome: Progressing  Goal: Maintain or return to baseline ADL function  Description: INTERVENTIONS:  -  Assess patient's ability to carry out ADLs; assess patient's baseline for ADL function and identify physical deficits which impact ability to perform ADLs (bathing, care of mouth/teeth, toileting, grooming, dressing, etc.)  - Assess/evaluate cause of self-care deficits   - Assess range of motion  - Assess patient's mobility; develop plan if impaired  - Assess patient's need for assistive devices and provide as appropriate  - Encourage maximum independence but intervene and supervise when necessary  - Involve family in performance of ADLs  - Assess for home care needs following discharge   - Consider OT consult to assist with ADL evaluation and planning for discharge  - Provide patient education as appropriate  Outcome: Progressing  Goal: Maintains/Returns to pre admission functional level  Description: INTERVENTIONS:  - Perform AM-PAC 6 Click Basic Mobility/ Daily Activity assessment daily.  - Set and communicate daily mobility goal to care team and patient/family/caregiver.   - Collaborate with rehabilitation services on mobility goals if consulted  - Out of bed for toileting  - Record patient progress and toleration of activity level   Outcome: Progressing     Problem: DISCHARGE PLANNING  Goal: Discharge to home or other facility with appropriate resources  Description: INTERVENTIONS:  - Identify barriers to discharge w/patient and caregiver  - Arrange for needed discharge resources and transportation as appropriate  - Identify discharge learning needs (meds, wound care, etc.)  - Arrange for interpretive services to assist at discharge as needed  - Refer to Case Management Department for coordinating discharge planning if the patient needs post-hospital services based on physician/advanced practitioner order or complex needs  related to functional status, cognitive ability, or social support system  Outcome: Progressing     Problem: Knowledge Deficit  Goal: Patient/family/caregiver demonstrates understanding of disease process, treatment plan, medications, and discharge instructions  Description: Complete learning assessment and assess knowledge base.  Interventions:  - Provide teaching at level of understanding  - Provide teaching via preferred learning methods  Outcome: Progressing     Problem: ANTEPARTUM  Goal: Maintain pregnancy as long as maternal and/or fetal condition is stable  Description: INTERVENTIONS:  - Maternal surveillance  - Fetal surveillance  - Monitor uterine activity  - Medications as ordered  - Bedrest  Outcome: Progressing     Problem: NEUROSENSORY - ADULT  Goal: Achieves stable or improved neurological status  Description: INTERVENTIONS  - Monitor and report changes in neurological status  - Monitor vital signs such as temperature, blood pressure, glucose, and any other labs ordered   - Initiate measures to prevent increased intracranial pressure  - Monitor for seizure activity and implement precautions if appropriate      Outcome: Progressing  Goal: Remains free of injury related to seizures activity  Description: INTERVENTIONS  - Maintain airway, patient safety  and administer oxygen as ordered  - Monitor patient for seizure activity, document and report duration and description of seizure to physician/advanced practitioner  - If seizure occurs,  ensure patient safety during seizure  - Reorient patient post seizure  - Seizure pads on all 4 side rails  - Instruct patient/family to notify RN of any seizure activity including if an aura is experienced  - Instruct patient/family to call for assistance with activity based on nursing assessment  - Administer anti-seizure medications if ordered    Outcome: Progressing     Problem: CARDIOVASCULAR - ADULT  Goal: Maintains optimal cardiac output and hemodynamic  stability  Description: INTERVENTIONS:  - Monitor I/O, vital signs and rhythm  - Monitor for S/S and trends of decreased cardiac output  - Administer and titrate ordered vasoactive medications to optimize hemodynamic stability  - Assess quality of pulses, skin color and temperature  - Assess for signs of decreased coronary artery perfusion  - Instruct patient to report change in severity of symptoms  Outcome: Progressing

## 2024-01-06 LAB
ALBUMIN SERPL BCP-MCNC: 3.1 G/DL (ref 3.5–5)
ALBUMIN SERPL BCP-MCNC: 3.4 G/DL (ref 3.5–5)
ALP SERPL-CCNC: 215 U/L (ref 34–104)
ALP SERPL-CCNC: 236 U/L (ref 34–104)
ALP SERPL-CCNC: 242 U/L (ref 34–104)
ALP SERPL-CCNC: 248 U/L (ref 34–104)
ALT SERPL W P-5'-P-CCNC: 192 U/L (ref 7–52)
ALT SERPL W P-5'-P-CCNC: 249 U/L (ref 7–52)
ALT SERPL W P-5'-P-CCNC: 297 U/L (ref 7–52)
ALT SERPL W P-5'-P-CCNC: 315 U/L (ref 7–52)
ANION GAP SERPL CALCULATED.3IONS-SCNC: 7 MMOL/L
ANION GAP SERPL CALCULATED.3IONS-SCNC: 7 MMOL/L
ANION GAP SERPL CALCULATED.3IONS-SCNC: 8 MMOL/L
ANION GAP SERPL CALCULATED.3IONS-SCNC: 8 MMOL/L
AST SERPL W P-5'-P-CCNC: 126 U/L (ref 13–39)
AST SERPL W P-5'-P-CCNC: 186 U/L (ref 13–39)
AST SERPL W P-5'-P-CCNC: 240 U/L (ref 13–39)
AST SERPL W P-5'-P-CCNC: 285 U/L (ref 13–39)
BASE EXCESS BLDCOA CALC-SCNC: -5.8 MMOL/L (ref 3–11)
BASE EXCESS BLDCOV CALC-SCNC: -4 MMOL/L (ref 1–9)
BILIRUB SERPL-MCNC: 0.19 MG/DL (ref 0.2–1)
BILIRUB SERPL-MCNC: 0.19 MG/DL (ref 0.2–1)
BILIRUB SERPL-MCNC: 0.21 MG/DL (ref 0.2–1)
BILIRUB SERPL-MCNC: 0.22 MG/DL (ref 0.2–1)
BUN SERPL-MCNC: 14 MG/DL (ref 5–25)
BUN SERPL-MCNC: 15 MG/DL (ref 5–25)
CALCIUM ALBUM COR SERPL-MCNC: 7.9 MG/DL (ref 8.3–10.1)
CALCIUM ALBUM COR SERPL-MCNC: 8 MG/DL (ref 8.3–10.1)
CALCIUM ALBUM COR SERPL-MCNC: 8.1 MG/DL (ref 8.3–10.1)
CALCIUM ALBUM COR SERPL-MCNC: 8.1 MG/DL (ref 8.3–10.1)
CALCIUM SERPL-MCNC: 7.4 MG/DL (ref 8.4–10.2)
CALCIUM SERPL-MCNC: 7.4 MG/DL (ref 8.4–10.2)
CALCIUM SERPL-MCNC: 7.5 MG/DL (ref 8.4–10.2)
CALCIUM SERPL-MCNC: 7.6 MG/DL (ref 8.4–10.2)
CHLORIDE SERPL-SCNC: 101 MMOL/L (ref 96–108)
CHLORIDE SERPL-SCNC: 102 MMOL/L (ref 96–108)
CHLORIDE SERPL-SCNC: 102 MMOL/L (ref 96–108)
CHLORIDE SERPL-SCNC: 103 MMOL/L (ref 96–108)
CO2 SERPL-SCNC: 21 MMOL/L (ref 21–32)
CO2 SERPL-SCNC: 22 MMOL/L (ref 21–32)
CO2 SERPL-SCNC: 24 MMOL/L (ref 21–32)
CO2 SERPL-SCNC: 24 MMOL/L (ref 21–32)
CREAT SERPL-MCNC: 0.88 MG/DL (ref 0.6–1.3)
CREAT SERPL-MCNC: 0.89 MG/DL (ref 0.6–1.3)
CREAT SERPL-MCNC: 0.94 MG/DL (ref 0.6–1.3)
CREAT SERPL-MCNC: 1.08 MG/DL (ref 0.6–1.3)
ERYTHROCYTE [DISTWIDTH] IN BLOOD BY AUTOMATED COUNT: 14.6 % (ref 11.6–15.1)
ERYTHROCYTE [DISTWIDTH] IN BLOOD BY AUTOMATED COUNT: 14.7 % (ref 11.6–15.1)
GFR SERPL CREATININE-BSD FRML MDRD: 72 ML/MIN/1.73SQ M
GFR SERPL CREATININE-BSD FRML MDRD: 85 ML/MIN/1.73SQ M
GFR SERPL CREATININE-BSD FRML MDRD: 91 ML/MIN/1.73SQ M
GFR SERPL CREATININE-BSD FRML MDRD: 92 ML/MIN/1.73SQ M
GLUCOSE SERPL-MCNC: 104 MG/DL (ref 65–140)
GLUCOSE SERPL-MCNC: 81 MG/DL (ref 65–140)
GLUCOSE SERPL-MCNC: 85 MG/DL (ref 65–140)
GLUCOSE SERPL-MCNC: 96 MG/DL (ref 65–140)
HCO3 BLDCOA-SCNC: 24 MMOL/L (ref 17.3–27.3)
HCO3 BLDCOV-SCNC: 22.9 MMOL/L (ref 12.2–28.6)
HCT VFR BLD AUTO: 34.7 % (ref 34.8–46.1)
HCT VFR BLD AUTO: 37.7 % (ref 34.8–46.1)
HCT VFR BLD AUTO: 38.4 % (ref 34.8–46.1)
HCT VFR BLD AUTO: 38.9 % (ref 34.8–46.1)
HGB BLD-MCNC: 11 G/DL (ref 11.5–15.4)
HGB BLD-MCNC: 11.8 G/DL (ref 11.5–15.4)
HGB BLD-MCNC: 11.9 G/DL (ref 11.5–15.4)
HGB BLD-MCNC: 12.3 G/DL (ref 11.5–15.4)
INR PPP: 0.86 (ref 0.84–1.19)
MAGNESIUM SERPL-MCNC: 5 MG/DL (ref 1.9–2.7)
MAGNESIUM SERPL-MCNC: 6.1 MG/DL (ref 1.9–2.7)
MAGNESIUM SERPL-MCNC: 6.8 MG/DL (ref 1.9–2.7)
MAGNESIUM SERPL-MCNC: 7.9 MG/DL (ref 1.9–2.7)
MCH RBC QN AUTO: 25.9 PG (ref 26.8–34.3)
MCH RBC QN AUTO: 26.2 PG (ref 26.8–34.3)
MCH RBC QN AUTO: 26.5 PG (ref 26.8–34.3)
MCH RBC QN AUTO: 26.6 PG (ref 26.8–34.3)
MCHC RBC AUTO-ENTMCNC: 31 G/DL (ref 31.4–37.4)
MCHC RBC AUTO-ENTMCNC: 31.3 G/DL (ref 31.4–37.4)
MCHC RBC AUTO-ENTMCNC: 31.6 G/DL (ref 31.4–37.4)
MCHC RBC AUTO-ENTMCNC: 31.7 G/DL (ref 31.4–37.4)
MCV RBC AUTO: 84 FL (ref 82–98)
O2 CT VFR BLDCOA CALC: 4.2 ML/DL
OXYHGB MFR BLDCOA: 18.4 %
OXYHGB MFR BLDCOV: 41.7 %
PCO2 BLDCOA: 65.2 MM[HG] (ref 30–60)
PCO2 BLDCOV: 48.4 MM HG (ref 27–43)
PH BLDCOA: 7.18 [PH] (ref 7.23–7.43)
PH BLDCOV: 7.29 [PH] (ref 7.19–7.49)
PLATELET # BLD AUTO: 223 THOUSANDS/UL (ref 149–390)
PLATELET # BLD AUTO: 251 THOUSANDS/UL (ref 149–390)
PLATELET # BLD AUTO: 273 THOUSANDS/UL (ref 149–390)
PLATELET # BLD AUTO: 278 THOUSANDS/UL (ref 149–390)
PMV BLD AUTO: 10.3 FL (ref 8.9–12.7)
PMV BLD AUTO: 10.4 FL (ref 8.9–12.7)
PMV BLD AUTO: 11 FL (ref 8.9–12.7)
PMV BLD AUTO: 11.1 FL (ref 8.9–12.7)
PO2 BLDCOA: 13.6 MM HG (ref 5–25)
PO2 BLDCOV: 19.7 MM HG (ref 15–45)
POTASSIUM SERPL-SCNC: 4.2 MMOL/L (ref 3.5–5.3)
POTASSIUM SERPL-SCNC: 4.6 MMOL/L (ref 3.5–5.3)
POTASSIUM SERPL-SCNC: 4.6 MMOL/L (ref 3.5–5.3)
POTASSIUM SERPL-SCNC: 4.7 MMOL/L (ref 3.5–5.3)
PROT SERPL-MCNC: 6 G/DL (ref 6.4–8.4)
PROT SERPL-MCNC: 6.5 G/DL (ref 6.4–8.4)
PROTHROMBIN TIME: 12.3 SECONDS (ref 11.6–14.5)
RBC # BLD AUTO: 4.15 MILLION/UL (ref 3.81–5.12)
RBC # BLD AUTO: 4.5 MILLION/UL (ref 3.81–5.12)
RBC # BLD AUTO: 4.59 MILLION/UL (ref 3.81–5.12)
RBC # BLD AUTO: 4.62 MILLION/UL (ref 3.81–5.12)
SAO2 % BLDCOV: 9.9 ML/DL
SODIUM SERPL-SCNC: 131 MMOL/L (ref 135–147)
SODIUM SERPL-SCNC: 132 MMOL/L (ref 135–147)
SODIUM SERPL-SCNC: 133 MMOL/L (ref 135–147)
SODIUM SERPL-SCNC: 133 MMOL/L (ref 135–147)
WBC # BLD AUTO: 11.48 THOUSAND/UL (ref 4.31–10.16)
WBC # BLD AUTO: 11.57 THOUSAND/UL (ref 4.31–10.16)
WBC # BLD AUTO: 13.19 THOUSAND/UL (ref 4.31–10.16)
WBC # BLD AUTO: 14.38 THOUSAND/UL (ref 4.31–10.16)

## 2024-01-06 PROCEDURE — 59409 OBSTETRICAL CARE: CPT | Performed by: OBSTETRICS & GYNECOLOGY

## 2024-01-06 PROCEDURE — 80053 COMPREHEN METABOLIC PANEL: CPT | Performed by: OBSTETRICS & GYNECOLOGY

## 2024-01-06 PROCEDURE — 83735 ASSAY OF MAGNESIUM: CPT | Performed by: OBSTETRICS & GYNECOLOGY

## 2024-01-06 PROCEDURE — 85027 COMPLETE CBC AUTOMATED: CPT | Performed by: OBSTETRICS & GYNECOLOGY

## 2024-01-06 PROCEDURE — 85610 PROTHROMBIN TIME: CPT | Performed by: NURSE ANESTHETIST, CERTIFIED REGISTERED

## 2024-01-06 PROCEDURE — 82805 BLOOD GASES W/O2 SATURATION: CPT | Performed by: OBSTETRICS & GYNECOLOGY

## 2024-01-06 PROCEDURE — 88307 TISSUE EXAM BY PATHOLOGIST: CPT | Performed by: PATHOLOGY

## 2024-01-06 RX ORDER — IBUPROFEN 600 MG/1
600 TABLET ORAL EVERY 6 HOURS
Status: DISCONTINUED | OUTPATIENT
Start: 2024-01-06 | End: 2024-01-08 | Stop reason: HOSPADM

## 2024-01-06 RX ORDER — KETOROLAC TROMETHAMINE 30 MG/ML
30 INJECTION, SOLUTION INTRAMUSCULAR; INTRAVENOUS EVERY 6 HOURS PRN
Status: DISCONTINUED | OUTPATIENT
Start: 2024-01-06 | End: 2024-01-08 | Stop reason: HOSPADM

## 2024-01-06 RX ORDER — ACETAMINOPHEN 325 MG/1
650 TABLET ORAL EVERY 4 HOURS PRN
Status: DISCONTINUED | OUTPATIENT
Start: 2024-01-06 | End: 2024-01-06

## 2024-01-06 RX ORDER — SODIUM CHLORIDE, SODIUM LACTATE, POTASSIUM CHLORIDE, CALCIUM CHLORIDE 600; 310; 30; 20 MG/100ML; MG/100ML; MG/100ML; MG/100ML
50 INJECTION, SOLUTION INTRAVENOUS CONTINUOUS
Status: DISCONTINUED | OUTPATIENT
Start: 2024-01-06 | End: 2024-01-07

## 2024-01-06 RX ORDER — BENZOCAINE/MENTHOL 6 MG-10 MG
1 LOZENGE MUCOUS MEMBRANE DAILY PRN
Status: DISCONTINUED | OUTPATIENT
Start: 2024-01-06 | End: 2024-01-08 | Stop reason: HOSPADM

## 2024-01-06 RX ORDER — OXYCODONE HYDROCHLORIDE 5 MG/1
5 TABLET ORAL
Status: DISCONTINUED | OUTPATIENT
Start: 2024-01-06 | End: 2024-01-08 | Stop reason: HOSPADM

## 2024-01-06 RX ORDER — CALCIUM CARBONATE 500 MG/1
1000 TABLET, CHEWABLE ORAL DAILY PRN
Status: DISCONTINUED | OUTPATIENT
Start: 2024-01-06 | End: 2024-01-08 | Stop reason: HOSPADM

## 2024-01-06 RX ORDER — LORATADINE 10 MG/1
10 TABLET ORAL DAILY
Status: DISCONTINUED | OUTPATIENT
Start: 2024-01-06 | End: 2024-01-08 | Stop reason: HOSPADM

## 2024-01-06 RX ORDER — OXYTOCIN/RINGER'S LACTATE 30/500 ML
250 PLASTIC BAG, INJECTION (ML) INTRAVENOUS ONCE
Status: DISCONTINUED | OUTPATIENT
Start: 2024-01-06 | End: 2024-01-08 | Stop reason: HOSPADM

## 2024-01-06 RX ORDER — BUTALBITAL, ACETAMINOPHEN AND CAFFEINE 50; 325; 40 MG/1; MG/1; MG/1
1 TABLET ORAL EVERY 4 HOURS PRN
Status: DISCONTINUED | OUTPATIENT
Start: 2024-01-06 | End: 2024-01-08 | Stop reason: HOSPADM

## 2024-01-06 RX ORDER — ALBUTEROL SULFATE 90 UG/1
2 AEROSOL, METERED RESPIRATORY (INHALATION) EVERY 6 HOURS PRN
Status: DISCONTINUED | OUTPATIENT
Start: 2024-01-06 | End: 2024-01-08 | Stop reason: HOSPADM

## 2024-01-06 RX ORDER — METOCLOPRAMIDE HYDROCHLORIDE 5 MG/ML
10 INJECTION INTRAMUSCULAR; INTRAVENOUS EVERY 6 HOURS PRN
Status: DISCONTINUED | OUTPATIENT
Start: 2024-01-06 | End: 2024-01-08 | Stop reason: HOSPADM

## 2024-01-06 RX ORDER — DIPHENHYDRAMINE HYDROCHLORIDE 50 MG/ML
50 INJECTION INTRAMUSCULAR; INTRAVENOUS EVERY 6 HOURS PRN
Status: DISCONTINUED | OUTPATIENT
Start: 2024-01-06 | End: 2024-01-08 | Stop reason: HOSPADM

## 2024-01-06 RX ORDER — ONDANSETRON 2 MG/ML
4 INJECTION INTRAMUSCULAR; INTRAVENOUS EVERY 8 HOURS PRN
Status: DISCONTINUED | OUTPATIENT
Start: 2024-01-06 | End: 2024-01-08 | Stop reason: HOSPADM

## 2024-01-06 RX ADMIN — MAGNESIUM SULFATE IN WATER 1 G/HR: 20 INJECTION, SOLUTION INTRAVENOUS at 09:02

## 2024-01-06 RX ADMIN — IBUPROFEN 600 MG: 600 TABLET ORAL at 20:39

## 2024-01-06 RX ADMIN — ROPIVACAINE HYDROCHLORIDE: 2 INJECTION, SOLUTION EPIDURAL; INFILTRATION at 02:07

## 2024-01-06 RX ADMIN — SODIUM CHLORIDE, SODIUM LACTATE, POTASSIUM CHLORIDE, AND CALCIUM CHLORIDE 50 ML/HR: .6; .31; .03; .02 INJECTION, SOLUTION INTRAVENOUS at 16:44

## 2024-01-06 RX ADMIN — BUTALBITAL, ACETAMINOPHEN, AND CAFFEINE 1 TABLET: 325; 50; 40 TABLET ORAL at 22:34

## 2024-01-06 RX ADMIN — IBUPROFEN 600 MG: 600 TABLET ORAL at 13:25

## 2024-01-06 RX ADMIN — LORATADINE 10 MG: 10 TABLET ORAL at 09:02

## 2024-01-06 RX ADMIN — NIFEDIPINE 60 MG: 30 TABLET, EXTENDED RELEASE ORAL at 09:02

## 2024-01-06 RX ADMIN — IBUPROFEN 600 MG: 600 TABLET ORAL at 03:15

## 2024-01-06 RX ADMIN — WITCH HAZEL 1 PAD: 500 SOLUTION RECTAL; TOPICAL at 03:16

## 2024-01-06 RX ADMIN — DIPHENHYDRAMINE HYDROCHLORIDE 50 MG: 50 INJECTION, SOLUTION INTRAMUSCULAR; INTRAVENOUS at 22:34

## 2024-01-06 RX ADMIN — HYDROCORTISONE 1 APPLICATION: 1 CREAM TOPICAL at 03:16

## 2024-01-06 RX ADMIN — BENZOCAINE AND LEVOMENTHOL 1 APPLICATION: 200; 5 SPRAY TOPICAL at 03:16

## 2024-01-06 RX ADMIN — ACETAMINOPHEN 650 MG: 325 TABLET, FILM COATED ORAL at 03:14

## 2024-01-06 RX ADMIN — METOCLOPRAMIDE 10 MG: 5 INJECTION, SOLUTION INTRAMUSCULAR; INTRAVENOUS at 22:34

## 2024-01-06 NOTE — ANESTHESIA PROCEDURE NOTES
Epidural Block    Patient location during procedure: OB/L&D  Start time: 1/5/2024 7:38 PM  Reason for block: procedure for pain  Staffing  Performed by: Myla Clark CRNA  Authorized by: Irvin Calixto MD    Preanesthetic Checklist  Completed: patient identified, IV checked, site marked, risks and benefits discussed, surgical consent, monitors and equipment checked, pre-op evaluation and timeout performed  Epidural  Patient position: sitting  Prep: ChloraPrep  Sedation Level: no sedation  Patient monitoring: frequent blood pressure checks, continuous pulse oximetry and heart rate  Approach: midline  Location: lumbar, L4-5  Injection technique: PATEL saline  Needle  Needle type: Tuohy   Needle gauge: 18 G  Needle insertion depth: 4.5 cm  Catheter type: multi-orifice  Catheter size: 20 G  Catheter at skin depth: 12 cm  Catheter securement method: stabilization device and clear occlusive dressing  Test dose: negative  Assessment  Sensory level: T10  Number of attempts: 1negative aspiration for CSF, negative aspiration for heme and no paresthesia on injection  patient tolerated the procedure well with no immediate complications

## 2024-01-06 NOTE — OB LABOR/OXYTOCIN SAFETY PROGRESS
Oxytocin Safety Progress Check Note - Monisha Liu 23 y.o. female MRN: [id number]    Unit/Bed#: -01 Encounter: [id number]    Dose (kamaljit-units/min) Oxytocin: 12 kamaljit-units/min  Contraction Frequency (minutes): 1-3.5  Contraction Intensity: Moderate  Uterine Activity Characteristics: Coupling  Cervical Dilation: 4        Cervical Effacement: 70  Fetal Station: -2  Baseline Rate (FHR): 130 bpm  Fetal Heart Rate (FHT): 139 BPM  FHR Category: 1               Vital Signs:   Vitals:    01/05/24 2230   BP: 133/89   Pulse:    Resp:    Temp:    SpO2:        Notes/comments:     Monisha is comfortable with her epidural.  Amniotomy was performed for clear fluid.  Bulging bag was noted prior to AROM.    Asia Burt DO 1/5/2024 11:01 PM

## 2024-01-06 NOTE — OB LABOR/OXYTOCIN SAFETY PROGRESS
Oxytocin Safety Progress Check Note - Monisha Liu 23 y.o. female MRN: [id number]    Unit/Bed#: -01 Encounter: [id number]    Dose (kamaljit-units/min) Oxytocin: 12 kamaljit-units/min  Contraction Frequency (minutes): difficult trace; RN at bedside adjusting; toco replaced  Contraction Intensity: Moderate  Uterine Activity Characteristics: Coupling  Cervical Dilation: 4-5        Cervical Effacement: 70  Fetal Station: -1  Baseline Rate (FHR): 130 bpm  Fetal Heart Rate (FHT): 133 BPM  FHR Category: 1               Vital Signs:   Vitals:    01/05/24 2325   BP: 134/78   Pulse: 93   Resp:    Temp:    SpO2:        Notes/comments:     Feeling intense rectal pressure.  Cervical exam as above.  IUPC placed per nursing request as contractions are difficult to trace.  Will get set up for delivery  w/ table - anticipate shortly    Asia Burt DO 1/6/2024 12:00 AM

## 2024-01-06 NOTE — OB LABOR/OXYTOCIN SAFETY PROGRESS
Oxytocin Safety Progress Check Note - Monisha Liu 23 y.o. female MRN: [id number]    Unit/Bed#: -01 Encounter: [id number]    Dose (kamaljit-units/min) Oxytocin: 12 kamaljit-units/min  Contraction Frequency (minutes): difficult trace; RN at bedside adjusting; toco replaced  Contraction Intensity: Moderate  Uterine Activity Characteristics: Coupling  Cervical Dilation: 4-5  Cervical Effacement: 80  Fetal Station: -1  Baseline Rate (FHR): 130 bpm  Fetal Heart Rate (FHT): 133 BPM  FHR Category: 2               Vital Signs:   Vitals:    01/06/24 0000   BP: 124/55   Pulse:    Resp:    Temp:    SpO2:        Notes/comments:     Still feeling the same intense pressure, small variables noted. Laboring in high hein's position.   SVE unchanged. Will recheck when pressure intensifies. Dr. Orozco notified    Asia Burt DO 1/6/2024 12:24 AM

## 2024-01-06 NOTE — ANESTHESIA PREPROCEDURE EVALUATION
Procedure:  LABOR ANALGESIA    Relevant Problems   CARDIO   (+) Pain of left breast   (+) Preeclampsia, severe, third trimester      GYN   (+) 32 weeks gestation of pregnancy        Physical Exam    Airway    Mallampati score: II  TM Distance: >3 FB  Neck ROM: full     Dental   No notable dental hx     Cardiovascular      Pulmonary      Other Findings  post-pubertal.      Anesthesia Plan  ASA Score- 2     Anesthesia Type- epidural with ASA Monitors.         Additional Monitors:     Airway Plan:            Plan Factors-Exercise tolerance (METS): >4 METS.    Chart reviewed.   Existing labs reviewed. Patient summary reviewed.                  Induction-     Postoperative Plan-     Informed Consent- Anesthetic plan and risks discussed with patient.  I personally reviewed this patient with the CRNA. Discussed and agreed on the Anesthesia Plan with the CRNA..

## 2024-01-06 NOTE — OB LABOR/OXYTOCIN SAFETY PROGRESS
Oxytocin Safety Progress Check Note - Monisha Liu 23 y.o. female MRN: [id number]    Unit/Bed#: -01 Encounter: [id number]    Dose (kamaljit-units/min) Oxytocin: 12 kamaljit-units/min  Contraction Frequency (minutes): 1-4  Contraction Intensity: Moderate  Uterine Activity Characteristics: Coupling  Cervical Dilation: 3-4        Cervical Effacement: 60  Fetal Station: -3  Baseline Rate (FHR): 130 bpm  Fetal Heart Rate (FHT): 129 BPM  FHR Category: Cat 1             Vital Signs:   Vitals:    01/05/24 2040   BP: 142/93   Pulse: 81   Resp:    Temp:    SpO2:        Notes/comments:   SVE as above. Unchanged from prior exam. Pt pain improved after epidural. Discussed increasing elevation in LFTs. Prior headache improved s/p tylenol. Plan for continued pitocin titration as tolerated. Consider AROM at next check if head well applied.  D/w Dr. Orozco.      Kranthi Garcia,  1/5/2024 9:22 PM

## 2024-01-06 NOTE — L&D DELIVERY NOTE
Vaginal Delivery Summary - OB/GYN   Monisha Liu 23 y.o. 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 (default)    Attending Physician: Dr. Mariajose Orozco  Resident Physician: Dr. Asia Burt, PGY-4 & Dr. Kranthi Garcia PGY2  Other Assistant: none    Anesthesia: Epidural ,     EBL: 25 cc     Complications: none apparent    Specimens:   1. Arterial and venous cord gases  2. Cord blood  3. Segment of umbilical cord  4. Placenta to pathology     Findings:  1. Viable male  on 2024  2:31 AM  via Vaginal, Spontaneous  with APGAR scores 6/8 at 1 and 5 min, and weight pending at time of dictation for NICU resuscitation  2. Spontaneous delivery of intact placenta   3. No lacerations    Gases:  Umbilical Artery  Recent Labs     24  0238   PHCART 7.184*   BECART -5.8*       Umbilical Vein  Recent Labs     24  0238   PHCVEN 7.293   BECVEN -4.0*           Brief history and labor course:  Monisha Liu is a 23 y.o. [id number] 32w2d . She presented to labor and delivery 31 weeks 4 days with a headache and elevated blood pressures at home.  She was diagnosed with preeclampsia with severe features.  She received magnesium, IV labetalol 20/40 + Procardia 30 XL on admission.  She was given a full course of betamethasone for fetal lung maturity.  Due to increasing in her blood pressure medication requirements as well as uptrending liver enzymes, there was concern that she was trending towards HELLP syndrome and decision was made to induce at 32 weeks 2 days    23: Labetalol 20/40 mg IV  24: Procardia XL 30 mg qd started  24: Procardia Xl increased to 60 mg qd  S/p Mag x24h > re-started in labor, s/p BTM -, s/p NICU  consult  AST/ALT: 49/45 > 84/101 > 144/164 -> 289/310    She was noted to be 1/50/-4 and was induced with a Gomez balloon and Cytotec.  She then received low-dose Pitocin titration.  She received an epidural for pain control.  Amniotomy was performed for clear fluid.  There was noted to be a category 2 tracing with variables and IUPC with amnioinfusion was started.  She progressed to full cervical dilation and began to push.      Description of delivery:  After pushing for 18 minutes, Monisha delivered a viable male , wt pending as mother is doing skin to skin bonding. The fetal vertex delivered direct OA position spontaneously. There was no nuchal cord. The right anterior shoulder delivered atraumatically with maternal expulsive forces and the assistance of gentle downward traction. The left posterior shoulder delivered with maternal expulsive forces and the assistance of gentle upward traction. The remainder of the fetus delivered spontaneously.      Upon delivery, the infant was placed on Monisha's abdomen and the cord was doubly clamped and cut. Delayed cord clamping was achieved. The infant was noted to cry spontaneously and was moving all extremities appropriately. There was no evidence for injury. Awaiting nurse resuscitators evaluated the . Arterial and venous cord blood gases and cord blood was collected for analysis. These were promptly sent to the lab. In the immediate post-partum, active management of the 3rd stage of labor was performed with massage, the administration of 30 units of IV pitocin, and gentle traction on the umbilical cord. The placenta delivered spontaneously and was noted to have a 3 vessel cord.  The cord was noted to be very short.  The placenta was sent to path     The vagina, cervix, perineum, and rectum were inspected and there was noted to be no lacerations.    At the conclusion of the procedure, all needle, sponge, and instrument counts were noted to be correct. Dr. Burt  was present and participated in all key portions of the case.    Disposition:  The patient tolerated the procedure well and is recovering in labor and delivery room   The  will be taken to the  ICU due to prematurity      Asia Burt,   OB/GYN PGY-4  2024  2:59 AM

## 2024-01-06 NOTE — OB LABOR/OXYTOCIN SAFETY PROGRESS
Oxytocin Safety Progress Check Note - Monisha Liu 23 y.o. female MRN: [id number]    Unit/Bed#: -01 Encounter: [id number]    Dose (kamaljit-units/min) Oxytocin: 14 kamaljit-units/min  Contraction Frequency (minutes): difficult trace  Contraction Intensity: Moderate  Uterine Activity Characteristics: Coupling  Cervical Dilation: 5        Cervical Effacement: 90  Fetal Station: 0  Baseline Rate (FHR): 130 bpm  Fetal Heart Rate (FHT): 131 BPM  FHR Category: 2               Vital Signs:   Vitals:    01/06/24 0030   BP:    Pulse:    Resp:    Temp: 97.5 °F (36.4 °C)   SpO2:        Notes/comments:     Please start an amnioinfusion via the intrauterine pressure catheter.  Please infuse a 300 bolus of fluid  Then, if you see return of fluid, start a maintenance infusion at 100 cc / hr of normal saline.        Asia Burt DO 1/6/2024 1:56 AM

## 2024-01-06 NOTE — OB LABOR/OXYTOCIN SAFETY PROGRESS
Oxytocin Safety Progress Check Note - Monisha Liu 23 y.o. female MRN: [id number]    Unit/Bed#: -01 Encounter: [id number]    Dose (kamaljit-units/min) Oxytocin: 14 kamaljit-units/min  Contraction Frequency (minutes): 2-4  Contraction Intensity: Moderate  Uterine Activity Characteristics: Regular  Cervical Dilation: 5        Cervical Effacement: 90  Fetal Station: -1  Baseline Rate (FHR): 130 bpm  Fetal Heart Rate (FHT): 61 BPM  FHR Category: 2               Vital Signs:   Vitals:    01/06/24 0030   BP:    Pulse:    Resp:    Temp: 97.5 °F (36.4 °C)   SpO2:        Notes/comments:     Variables and minimal variability noted.  Cervical exam performed and positive scalp stimulation.  Slowly progressing.    Asia Burt DO 1/6/2024 1:17 AM

## 2024-01-06 NOTE — OB LABOR/OXYTOCIN SAFETY PROGRESS
Oxytocin Safety Progress Check Note - Monisha Liu 23 y.o. female MRN: [id number]    Unit/Bed#: -01 Encounter: [id number]    Dose (kamaljit-units/min) Oxytocin: 14 kamaljit-units/min  Contraction Frequency (minutes): 2-3  Contraction Intensity: Moderate  Uterine Activity Characteristics: Coupling  Cervical Dilation: 5        Cervical Effacement: 90  Fetal Station: 0  Baseline Rate (FHR): 130 bpm  Fetal Heart Rate (FHT): 138 BPM  FHR Category: 2               Vital Signs:   Vitals:    01/06/24 0030   BP:    Pulse:    Resp:    Temp: 97.5 °F (36.4 °C)   SpO2:        Notes/comments:     IUPC replaced, will start amnioinfusion if variables return. Tracing returns to Cat 1 with repositioning      Asia Burt DO 1/6/2024 1:44 AM

## 2024-01-06 NOTE — ANESTHESIA POSTPROCEDURE EVALUATION
Post-Op Assessment Note    CV Status:  Stable         Hydration Status:  Stable   Airway Patency:  Patent     Post Op Vitals Reviewed: Yes      Staff: Anesthesiologist

## 2024-01-06 NOTE — DISCHARGE INSTRUCTIONS
Self Care After Delivery   AMBULATORY CARE:   The postpartum period is the period of time from delivery to about 6 weeks. During this time you may experience many physical and emotional changes. It is important to understand what is normal and when you need to call your healthcare provider. It is also important to know how to care for yourself during this time.  Call your local emergency number (911 in the [place]) for any of the following:   You see or hear things that are not there, or have thoughts of harming yourself or your baby.     You soak through 1 pad in 15 minutes, have blurry vision, clammy or pale skin, and feel faint.     You faint or lose consciousness.     You have trouble breathing.     You cough up blood.     Your  incision comes apart.     Seek care immediately if:   Your heart is beating faster than usual.     You have a bad headache or changes in your vision.     Your perineal tear, episiotomy site, or  incision is red, swollen, bleeding, or draining pus.     You have severe abdominal pain.     Call your doctor or obstetrician if:   Your leg is painful, red, and larger than usual.     You soak through 1 or more pads in an hour, or pass blood clots larger than a quarter from your vagina.     You have a fever.     You have new or worsening pain in your abdomen or vagina.     You continue to have depression 1 to 2 weeks after you deliver.     You have trouble sleeping.     You have foul-smelling discharge from your vagina.     You have pain or burning when you urinate.     You do not have a bowel movement for 3 days or more.     You have nausea or are vomiting.     You have hard lumps or red streaks over your breasts.     You have cracked nipples or bleed from your nipples.     You have questions or concerns about your condition or care.     Physical changes: The following are normal changes after you give birth:  Pain in the area between your anus and vagina     Breast pain      Constipation or hemorrhoids     Hot or cold flashes     Vaginal bleeding or discharge     Mild to moderate abdominal cramping     Difficulty controlling bowel movements or urine     Emotional changes: A drop in hormone levels after you deliver may cause changes in your emotions. You may feel irritable, sad, or anxious. You may cry easily or for no reason. You may also feel depressed. Depression that continues can be a sign of postpartum depression, a condition that can be treated. Treatment may include talk therapy, medicines, or both. Healthcare providers will ask how you are feeling and if you have any depression. These talks can happen during appointments for your medical care and for your baby's care, such as well child visits. Providers can help you find ways to care for yourself and your baby. Talk to your providers about the following:  When emotional changes or depression started, and if it is getting worse over time     Problems you are having with daily activities, sleep, or caring for your baby     If anything makes you feel worse, or makes you feel better     Feeling that you are not bonding with your baby the way you want     Any problems your baby has with sleeping or feeding     Your baby is fussy or cries a lot     Support you have from friends, family, or others     Breast care for breastfeeding mothers: You may have sore breasts for 3 to 6 days after you give birth. This happens as your milk begins to fill your breasts. You may also have sore breasts if you do not breastfeed frequently. Do the following to care for your breasts:  Apply a moist, warm, compress to your breast as directed. This may help soothe your breasts. Make sure the washcloth is not too hot before you apply it to your breast.     Nurse your baby or pump your milk frequently. This may prevent clogged milk ducts. Ask your healthcare provider how often to nurse or pump.     Massage your breasts as directed. This may help increase  your milk flow. Gently rub your breasts in a circular motion before you breastfeed. You may need to gently squeeze your breast or nipple to help release milk. You can also use a breast pump to help release milk from your breast.     Wash your breasts with warm water only. Do not put soap on your nipples. Soap may cause your nipples to become dry.     Apply lanolin cream to your nipples as directed. Lanolin cream may add moisture to your skin and prevent nipple dryness. Always wash off lanolin cream with warm water before you breastfeed.     Place pads in your bra. Your nipples may leak milk when you are not breastfeeding. You can place pads inside of your bra to help prevent leaking onto your clothing. Ask your healthcare provider where to purchase bra pads.     Get breastfeeding support if needed. Healthcare providers can answer questions about breastfeeding and provide you with support. Ask your healthcare provider who you can contact if you need breastfeeding support.     Breast care for non-breastfeeding mothers: Milk will fill your breasts even if you bottle feed your baby. Do the following to help stop your milk from filling your breasts and causing pain:  Wear a bra with support at all times. A sports bra or a tight-fitting bra will help stop your milk from coming in.     Apply ice on each breast for 15 to 20 minutes every hour or as directed. Use an ice pack, or put crushed ice in a plastic bag. Cover it with a towel before you apply it to your breast. Ice helps your milk ducts shrink.     Keep your breasts away from warm water. Warm water will make it easier for milk to fill your breasts. Stand with your breasts away from warm water in the shower.     Limit how much you touch your breasts. This will prevent them from filling with milk.     Perineum care: Your perineum is the area between your rectum and vagina. It is normal to have swelling and pain in this area after you give birth. If you had an  episiotomy, your healthcare provider may give you special instructions.  Clean your perineum after you use the bathroom. This may prevent infection and help with healing. Use a spray bottle with warm water to clean your perineum. You may also gently spray warm water against your perineum when you urinate. Always wipe front to back.     Take a sitz bath as directed. A sitz bath may help relieve swelling and pain. Fill your bath tub or bucket with water up to your hips and sit in the water. Use cold water for 2 days after you deliver. Then use warm water. Ask your healthcare provider for more information about a sitz bath.     Apply ice packs for the first 24 hours or as directed. Use a plastic glove filled with ice or buy an ice pack. Wrap the ice pack or plastic glove in a small towel or wash cloth. Place the ice pack on your perineum for 20 minutes at a time.     Sit on a donut-shaped pillow. This may relieve pressure on your perineum when you sit.     Use wipes that contain medicine or take pills as directed. Your healthcare provider may tell you to use witch hazel pads. You can place witch hazel pads in the refrigerator before you apply them to your perineum. Your provider may also tell you to take NSAIDs. Ask him or her how often to take pills or use the wipes.     Do not go swimming or take tub baths for 4 to 6 weeks or as directed. This will help prevent an infection in your vagina or uterus.     Bowel and bladder care: It may take 3 to 5 days to have a bowel movement after you deliver your baby. You can do the following to prevent or manage constipation, and get control of your bowel or bladder:  Take stool softeners as directed. A stool softener is medicine that will make your bowel movements softer. This may prevent or relieve constipation. A stool softener may also make bowel movements less painful.     Drink plenty of liquids. Ask how much liquid to drink each day and which liquids are best for you.  Liquids may help prevent constipation.     Eat foods high in fiber. Examples include fruits, vegetables, grains, beans, and lentils. Ask your healthcare provider how much fiber you need each day. Fiber may prevent constipation.          Do Kegel exercises as directed. Kegel exercises will help strengthen the muscles that control bowel movements and urination. Ask your healthcare provider for more information on Kegel exercises.     Apply cold compresses or medicine to hemorrhoids as directed. This may relieve swelling and pain. Your healthcare provider may tell you to apply ice or wipes that contain medicine to your hemorrhoids. He or she may also tell you to use a sitz bath. Ask your provider for more information on how to manage hemorrhoids.     Nutrition: Good nutrition is important in the postpartum period. It will help you return to a healthy weight, increase your energy levels, and prevent constipation. It will also help you get enough nutrients and calories if you are going to breastfeed your baby.  Eat a variety of healthy foods. Healthy foods include fruits, vegetables, whole-grain breads, low-fat dairy products, beans, lean meats, and fish. You may need 500 to 700 extra calories each day if you breastfeed your baby. You may also need extra protein.          Limit foods with added sugar and high amounts of fat. These foods are high in calories and low in healthy nutrients. Read food labels so you know how much sugar and fat is in the food you want to eat.     Drink 8 to 10 glasses of water per day. Water will help you make plenty of milk for your baby. It will also help prevent constipation. Drink a glass of water every time you breastfeed your baby.     Take vitamins as directed. Ask your healthcare provider what vitamins you need.     Limit caffeine and alcohol if you are breastfeeding. Caffeine and alcohol can get into your breast milk. Caffeine and alcohol can make your baby fussy. They can also  interfere with your baby's sleep. Ask your healthcare provider if you can drink alcohol or caffeine.     Rest and sleep: You may feel very tired in the postpartum period. Enough sleep will help you heal and give you energy to care for your baby. The following may help you get sleep and rest:  Nap when your baby naps. Your baby may nap several times during the day. Get rest during this time.     Limit visitors. Many people may want to see you and your baby. Ask friends or family to visit on different days. This will give you time to rest.     Do not plan too much for one day. Put off household chores so that you have time to rest. Gradually do more each day.     Ask for help from family, friends, or neighbors. Ask them to help you with laundry, cleaning, or errands. Also ask someone to watch the baby while you take a nap or relax. Ask your partner to help with the care of your baby. Pump some of your breast milk so your partner can feed your baby during the night.     Exercise after delivery: Wait until your healthcare provider says it is okay to exercise. Exercise can help you lose weight, increase your energy levels, and manage your mood. It can also prevent constipation and blood clots. Start with gentle exercises such as walking. Do more as you have more energy. You may need to avoid abdominal exercises for 1 to 2 weeks after you deliver. Talk to your healthcare provider about an exercise plan that is right for you.     Sexual activity after delivery:   Do not have sex until your healthcare provider says it is okay. You may need to wait 4 to 6 weeks before you have sex. This may prevent infection and allow time to heal.     Your menstrual cycle may begin as soon as 3 weeks after you deliver. Your period may be delayed if you breastfeed your baby. You can become pregnant before you get your first postpartum period. Talk to your healthcare provider about birth control that is right for you. Some types of birth  control are not safe during breastfeeding.     For support and more information: Join a support group for new mothers. Ask for help from family and friends with chores, errands, and care of your baby.  Office of Women's Health,  Department of Health and Human Services  [address] [place] [address]  [address] [place] [address]  Phone: [phone]  Web Address: www.womenshealth.gov  [facility]  [address] [place] [address]  Web Address: http://[email or web address]/[email or web address]-[email or web address]  Follow up with your doctor or obstetrician as directed: You will need to follow up within 2 to 6 weeks of delivery. Write down your questions so you remember to ask them at your visits.  © Copyright [employer] 2020 Information is for End User's use only and may not be sold, redistributed or otherwise used for commercial purposes. All illustrations and images included in CareNotes® are the copyrighted property of [employer] or [employer]  The above information is an  only. It is not intended as medical advice for individual conditions or treatments. Talk to your doctor, nurse or pharmacist before following any medical regimen to see if it is safe and effective for you.

## 2024-01-06 NOTE — PLAN OF CARE
Problem: PAIN - ADULT  Goal: Verbalizes/displays adequate comfort level or baseline comfort level  Description: Interventions:  - Encourage patient to monitor pain and request assistance  - Assess pain using appropriate pain scale  - Administer analgesics based on type and severity of pain and evaluate response  - Implement non-pharmacological measures as appropriate and evaluate response  - Consider cultural and social influences on pain and pain management  - Notify physician/advanced practitioner if interventions unsuccessful or patient reports new pain  1/5/2024 2142 by Raquel Gee RN  Outcome: Progressing  1/5/2024 2140 by Raquel Gee RN  Outcome: Progressing     Problem: INFECTION - ADULT  Goal: Absence or prevention of progression during hospitalization  Description: INTERVENTIONS:  - Assess and monitor for signs and symptoms of infection  - Monitor lab/diagnostic results  - Monitor all insertion sites, i.e. indwelling lines, tubes, and drains  - Monitor endotracheal if appropriate and nasal secretions for changes in amount and color  - Orondo appropriate cooling/warming therapies per order  - Administer medications as ordered  - Instruct and encourage patient and family to use good hand hygiene technique  - Identify and instruct in appropriate isolation precautions for identified infection/condition  1/5/2024 2142 by Raquel Gee RN  Outcome: Progressing  1/5/2024 2140 by Raquel Gee RN  Outcome: Progressing  Goal: Absence of fever/infection during neutropenic period  Description: INTERVENTIONS:  - Monitor WBC    1/5/2024 2142 by Raquel Gee RN  Outcome: Progressing  1/5/2024 2140 by Raquel Gee RN  Outcome: Progressing     Problem: SAFETY ADULT  Goal: Patient will remain free of falls  Description: INTERVENTIONS:  - Educate patient/family on patient safety including physical limitations  - Instruct patient to call for assistance with activity   - Consult OT/PT to assist with  strengthening/mobility   - Keep Call bell within reach  - Keep bed low and locked with side rails adjusted as appropriate  - Keep care items and personal belongings within reach  - Initiate and maintain comfort rounds  - Make Fall Risk Sign visible to staff  - Apply yellow socks and bracelet for high fall risk patients  - Consider moving patient to room near nurses station  1/5/2024 2142 by Raquel Gee RN  Outcome: Progressing  1/5/2024 2140 by Raquel Gee RN  Outcome: Progressing  Goal: Maintain or return to baseline ADL function  Description: INTERVENTIONS:  -  Assess patient's ability to carry out ADLs; assess patient's baseline for ADL function and identify physical deficits which impact ability to perform ADLs (bathing, care of mouth/teeth, toileting, grooming, dressing, etc.)  - Assess/evaluate cause of self-care deficits   - Assess range of motion  - Assess patient's mobility; develop plan if impaired  - Assess patient's need for assistive devices and provide as appropriate  - Encourage maximum independence but intervene and supervise when necessary  - Involve family in performance of ADLs  - Assess for home care needs following discharge   - Consider OT consult to assist with ADL evaluation and planning for discharge  - Provide patient education as appropriate  1/5/2024 2142 by Raquel Gee RN  Outcome: Progressing  1/5/2024 2140 by Raquel Gee RN  Outcome: Progressing  Goal: Maintains/Returns to pre admission functional level  Description: INTERVENTIONS:  - Perform AM-PAC 6 Click Basic Mobility/ Daily Activity assessment daily.  - Set and communicate daily mobility goal to care team and patient/family/caregiver.   - Collaborate with rehabilitation services on mobility goals if consulted  - Out of bed for toileting  - Record patient progress and toleration of activity level   1/5/2024 2142 by Raquel Gee RN  Outcome: Progressing  1/5/2024 2140 by Raquel Gee RN  Outcome: Progressing      Problem: DISCHARGE PLANNING  Goal: Discharge to home or other facility with appropriate resources  Description: INTERVENTIONS:  - Identify barriers to discharge w/patient and caregiver  - Arrange for needed discharge resources and transportation as appropriate  - Identify discharge learning needs (meds, wound care, etc.)  - Arrange for interpretive services to assist at discharge as needed  - Refer to Case Management Department for coordinating discharge planning if the patient needs post-hospital services based on physician/advanced practitioner order or complex needs related to functional status, cognitive ability, or social support system  1/5/2024 2142 by Raquel Gee RN  Outcome: Progressing  1/5/2024 2140 by Raquel Gee RN  Outcome: Progressing     Problem: Knowledge Deficit  Goal: Patient/family/caregiver demonstrates understanding of disease process, treatment plan, medications, and discharge instructions  Description: Complete learning assessment and assess knowledge base.  Interventions:  - Provide teaching at level of understanding  - Provide teaching via preferred learning methods  1/5/2024 2142 by Raquel Gee RN  Outcome: Progressing  1/5/2024 2140 by Raquel Gee RN  Outcome: Progressing  Goal: Verbalizes understanding of labor plan  Description: Assess patient/family/caregiver's baseline knowledge level and ability to understand information.  Provide education via patient/family/caregiver's preferred learning method at appropriate level of understanding.     1. Provide teaching at level of understanding.  2. Provide teaching via preferred learning method(s).  1/5/2024 2142 by Raquel Gee RN  Outcome: Progressing  1/5/2024 2140 by Raquel Gee RN  Outcome: Progressing     Problem: NEUROSENSORY - ADULT  Goal: Achieves stable or improved neurological status  Description: INTERVENTIONS  - Monitor and report changes in neurological status  - Monitor vital signs such as temperature,  blood pressure, glucose, and any other labs ordered   - Initiate measures to prevent increased intracranial pressure  - Monitor for seizure activity and implement precautions if appropriate      1/5/2024 2142 by Raquel Gee RN  Outcome: Progressing  1/5/2024 2140 by Raquel Gee RN  Outcome: Progressing  Goal: Remains free of injury related to seizures activity  Description: INTERVENTIONS  - Maintain airway, patient safety  and administer oxygen as ordered  - Monitor patient for seizure activity, document and report duration and description of seizure to physician/advanced practitioner  - If seizure occurs,  ensure patient safety during seizure  - Reorient patient post seizure  - Seizure pads on all 4 side rails  - Instruct patient/family to notify RN of any seizure activity including if an aura is experienced  - Instruct patient/family to call for assistance with activity based on nursing assessment  - Administer anti-seizure medications if ordered    1/5/2024 2142 by Raquel Gee RN  Outcome: Progressing  1/5/2024 2140 by Raquel Gee RN  Outcome: Progressing     Problem: CARDIOVASCULAR - ADULT  Goal: Maintains optimal cardiac output and hemodynamic stability  Description: INTERVENTIONS:  - Monitor I/O, vital signs and rhythm  - Monitor for S/S and trends of decreased cardiac output  - Administer and titrate ordered vasoactive medications to optimize hemodynamic stability  - Assess quality of pulses, skin color and temperature  - Assess for signs of decreased coronary artery perfusion  - Instruct patient to report change in severity of symptoms  1/5/2024 2142 by Raquel Gee RN  Outcome: Progressing  1/5/2024 2140 by Raquel Gee RN  Outcome: Progressing     Problem: Labor & Delivery  Goal: Manages discomfort  Description: Assess and monitor for signs and symptoms of discomfort.  Assess patient's pain level regularly and per hospital policy.  Administer medications as ordered. Support use of  nonpharmacological methods to help control pain such as distraction, imagery, relaxation, and application of heat and cold.  Collaborate with interdisciplinary team and patient to determine appropriate pain management plan.    1. Include patient in decisions related to comfort.  2. Offer non-pharmacological pain management interventions.  3. Report ineffective pain management to physician.  2024 by Raquel Gee RN  Outcome: Progressing  2024 by Raquel Gee RN  Outcome: Progressing  Goal: Patient vital signs are stable  Description: 1. Assess vital signs - vaginal delivery.  2024 by Raquel Gee RN  Outcome: Progressing  2024 by Raquel Gee RN  Outcome: Progressing     Problem: BIRTH - VAGINAL/ SECTION  Goal: Fetal and maternal status remain reassuring during the birth process  Description: INTERVENTIONS:  - Monitor vital signs  - Monitor fetal heart rate  - Monitor uterine activity  - Monitor labor progression (vaginal delivery)  - DVT prophylaxis  - Antibiotic prophylaxis  Outcome: Progressing  Goal: Emotionally satisfying birthing experience for mother/fetus  Description: Interventions:  - Assess, plan, implement and evaluate the nursing care given to the patient in labor  - Advocate the philosophy that each childbirth experience is a unique experience and support the family's chosen level of involvement and control during the labor process   - Actively participate in both the patient's and family's teaching of the birth process  - Consider cultural, [employer] and age-specific factors and plan care for the patient in labor  Outcome: Progressing

## 2024-01-07 LAB
ABO GROUP BLD: NORMAL
ALBUMIN SERPL BCP-MCNC: 3 G/DL (ref 3.5–5)
ALP SERPL-CCNC: 196 U/L (ref 34–104)
ALT SERPL W P-5'-P-CCNC: 157 U/L (ref 7–52)
ANION GAP SERPL CALCULATED.3IONS-SCNC: 8 MMOL/L
AST SERPL W P-5'-P-CCNC: 96 U/L (ref 13–39)
BILIRUB SERPL-MCNC: 0.18 MG/DL (ref 0.2–1)
BLD GP AB SCN SERPL QL: NEGATIVE
BUN SERPL-MCNC: 17 MG/DL (ref 5–25)
CALCIUM ALBUM COR SERPL-MCNC: 8.2 MG/DL (ref 8.3–10.1)
CALCIUM SERPL-MCNC: 7.4 MG/DL (ref 8.4–10.2)
CHLORIDE SERPL-SCNC: 103 MMOL/L (ref 96–108)
CO2 SERPL-SCNC: 22 MMOL/L (ref 21–32)
CREAT SERPL-MCNC: 1.06 MG/DL (ref 0.6–1.3)
ERYTHROCYTE [DISTWIDTH] IN BLOOD BY AUTOMATED COUNT: 14.6 % (ref 11.6–15.1)
GFR SERPL CREATININE-BSD FRML MDRD: 74 ML/MIN/1.73SQ M
GLUCOSE SERPL-MCNC: 95 MG/DL (ref 65–140)
HCT VFR BLD AUTO: 34.4 % (ref 34.8–46.1)
HGB BLD-MCNC: 10.8 G/DL (ref 11.5–15.4)
MAGNESIUM SERPL-MCNC: 4.2 MG/DL (ref 1.9–2.7)
MCH RBC QN AUTO: 26.5 PG (ref 26.8–34.3)
MCHC RBC AUTO-ENTMCNC: 31.4 G/DL (ref 31.4–37.4)
MCV RBC AUTO: 85 FL (ref 82–98)
PLATELET # BLD AUTO: 213 THOUSANDS/UL (ref 149–390)
PMV BLD AUTO: 10.6 FL (ref 8.9–12.7)
POTASSIUM SERPL-SCNC: 4.4 MMOL/L (ref 3.5–5.3)
PROT SERPL-MCNC: 5.7 G/DL (ref 6.4–8.4)
RBC # BLD AUTO: 4.07 MILLION/UL (ref 3.81–5.12)
RH BLD: POSITIVE
SODIUM SERPL-SCNC: 133 MMOL/L (ref 135–147)
SPECIMEN EXPIRATION DATE: NORMAL
WBC # BLD AUTO: 11.29 THOUSAND/UL (ref 4.31–10.16)

## 2024-01-07 PROCEDURE — 86850 RBC ANTIBODY SCREEN: CPT | Performed by: OBSTETRICS & GYNECOLOGY

## 2024-01-07 PROCEDURE — 80053 COMPREHEN METABOLIC PANEL: CPT | Performed by: OBSTETRICS & GYNECOLOGY

## 2024-01-07 PROCEDURE — 85027 COMPLETE CBC AUTOMATED: CPT | Performed by: OBSTETRICS & GYNECOLOGY

## 2024-01-07 PROCEDURE — 86901 BLOOD TYPING SEROLOGIC RH(D): CPT | Performed by: OBSTETRICS & GYNECOLOGY

## 2024-01-07 PROCEDURE — 83735 ASSAY OF MAGNESIUM: CPT | Performed by: OBSTETRICS & GYNECOLOGY

## 2024-01-07 PROCEDURE — 86900 BLOOD TYPING SEROLOGIC ABO: CPT | Performed by: OBSTETRICS & GYNECOLOGY

## 2024-01-07 RX ADMIN — NIFEDIPINE 60 MG: 30 TABLET, EXTENDED RELEASE ORAL at 09:33

## 2024-01-07 RX ADMIN — LORATADINE 10 MG: 10 TABLET ORAL at 09:32

## 2024-01-07 RX ADMIN — IBUPROFEN 600 MG: 600 TABLET ORAL at 09:33

## 2024-01-07 RX ADMIN — IBUPROFEN 600 MG: 600 TABLET ORAL at 19:44

## 2024-01-07 RX ADMIN — IBUPROFEN 600 MG: 600 TABLET ORAL at 02:36

## 2024-01-07 NOTE — PROGRESS NOTES
Called for 10/10 headache. Nurse reports patient's right leg gave out when she was walking to bathroom. No fall. Patient would like her magnesium to come off. Discussed that it will come off at 0230.     Objective:  Cardio: regular rate and rhythm  Pulm: normal effort, lungs clear to auscultation bilaterally bilaterally   Neuro: No clonus present, LE reflexes wnl     Blood pressure 152/95    Plan:  -Reglan  -Benadryl  -Fioricet   -Follow up magnesium level    Discussed with Dr. Linares

## 2024-01-07 NOTE — PROGRESS NOTES
Progress Note - OB/GYN  Monisha Liu 23 y.o. female MRN: [id number]  Unit/Bed#: -01 Encounter: [id number]    Assessment and Plan     Monisha Liu is a patient of: [facility] by default as she is in the area visiting family. She received her previous care in [place] where she lives.  She is PPD# 1 s/p  spontaneous vaginal delivery  Recovering well and is stable       Nonimmune to hepatitis B virus  Assessment & Plan  Vaccine series PP    Preeclampsia, severe, third trimester  Assessment & Plan  : SRBPs treated with 20 mg IV labetalol, CBC wnl, CMP with Cr 0.8, AST and ALT wnl, P/C 0.4  Systolic (24hrs), Av , Min:121 , Max:152   Diastolic (24hrs), Av, Min:67, Max:95  S/p 24 hr magnesium  PO Regimen: Procardia 60 XL qd    Platelet        Results from last 7 days   Lab Units 24  0545 24  16024  1010 24  0404 24  1420   PLATELETS Thousands/uL 213 223 251 273 278 318 318   , Renal      Results from last 7 days   Lab Units 24  0532 24  16024  1010 24  0404 24  1420   BUN mg/dL 17 15 14 14 14 13 13   CREATININE mg/dL 1.06 0.94 1.08 0.89 0.88 0.90 0.88   EGFR ml/min/1.73sq m 74 85 72 91 92 90 92   , or LFT's       Results from last 7 days   Lab Units 24  0532 24  2220 24  1601 24  1010 24  04024  1420   AST U/L 96* 126* 186* 240* 285* 289* 144*   ALT U/L 157* 192* 249* 297* 315* 310* 164*   ALK PHOS U/L 196* 215* 242* 236* 248* 279* 288*   TOTAL PROTEIN g/dL 5.7* 6.0* 6.5 6.5 6.5 6.9 6.9   ALBUMIN g/dL 3.0* 3.1* 3.4* 3.4* 3.4* 3.6 3.6   TOTAL BILIRUBIN mg/dL 0.18* 0.19* 0.19* 0.22 0.21 0.19* 0.18*        *  (spontaneous vaginal delivery)  Assessment & Plan  Lochia WNL   Recovering well   Appropriate bowel and bladder function   Pain well controlled   Tolerating diet   Ambulating without issues   No  "lower extremity tenderness  GBS negative   Rh positive           Disposition    - Anticipate discharge home on PPD# 2, pending blood pressures      Subjective/Objective     Chief Complaint: Postpartum State     Subjective:    Monisha Liu is PPD/POD#1 s/p  spontaneous vaginal delivery. She has no current complaints. She is feeling much better since the magnesium was discontinued earlier this morning.  Pain is well controlled.  Patient is currently voiding.  She is ambulating.  Patient is currently passing flatus and has had no bowel movement. She is tolerating PO, and denies nausea or vomitting. Patient denies fever, chills, chest pain, shortness of breath, or calf tenderness. Lochia is minimal. She is  Bottle feeding and Using breast pump. She is recovering well and is stable.       Vitals:   /81 (BP Location: Left arm)   Pulse 92   Temp 97.8 °F (36.6 °C) (Oral)   Resp 18   Ht 5' 4\" (1.626 m)   Wt 77.1 kg (170 lb)   LMP  (Exact Date)   SpO2 97%   Breastfeeding Unknown   BMI 29.18 kg/m²       Intake/Output Summary (Last 24 hours) at 1/7/2024 0600  Last data filed at 1/6/2024 2210  Gross per 24 hour   Intake 2165 ml   Output 3800 ml   Net -1635 ml       Invasive Devices       Peripheral Intravenous Line  Duration             Peripheral IV 01/04/24 Right;Ventral (anterior) Forearm 3 days                    Physical Exam:   GEN: Monisha Liu appears well, alert and oriented x 3, pleasant and cooperative   CARDIO: RRR, no murmurs or rubs  RESP:  CTAB, no wheezes or rales  ABDOMEN: soft, no tenderness, no distention, fundus @ u-1  EXTREMITIES: SCDs on, non tender, no erythema    Labs:     Hemoglobin   Date Value Ref Range Status   01/07/2024 10.8 (L) 11.5 - 15.4 g/dL Final   01/06/2024 11.0 (L) 11.5 - 15.4 g/dL Final     WBC   Date Value Ref Range Status   01/07/2024 11.29 (H) 4.31 - 10.16 Thousand/uL Final   01/06/2024 11.48 (H) 4.31 - 10.16 Thousand/uL Final     Platelets   Date Value Ref Range Status "   01/07/2024 213 149 - 390 Thousands/uL Final   01/06/2024 223 149 - 390 Thousands/uL Final     Creatinine   Date Value Ref Range Status   01/06/2024 0.94 0.60 - 1.30 mg/dL Final     Comment:     Standardized to IDMS reference method   01/06/2024 1.08 0.60 - 1.30 mg/dL Final     Comment:     Standardized to IDMS reference method     AST   Date Value Ref Range Status   01/06/2024 126 (H) 13 - 39 U/L Final   01/06/2024 186 (H) 13 - 39 U/L Final     ALT   Date Value Ref Range Status   01/06/2024 192 (H) 7 - 52 U/L Final     Comment:     Specimen collection should occur prior to Sulfasalazine administration due to the potential for falsely depressed results.    01/06/2024 249 (H) 7 - 52 U/L Final     Comment:     Specimen collection should occur prior to Sulfasalazine administration due to the potential for falsely depressed results.           Kranthi Garcia,   1/7/2024  6:00 AM

## 2024-01-08 VITALS
HEART RATE: 90 BPM | SYSTOLIC BLOOD PRESSURE: 124 MMHG | DIASTOLIC BLOOD PRESSURE: 78 MMHG | BODY MASS INDEX: 29.02 KG/M2 | HEIGHT: 64 IN | OXYGEN SATURATION: 99 % | WEIGHT: 170 LBS | RESPIRATION RATE: 18 BRPM | TEMPERATURE: 98.5 F

## 2024-01-08 LAB
ALBUMIN SERPL BCP-MCNC: 3.3 G/DL (ref 3.5–5)
ALP SERPL-CCNC: 166 U/L (ref 34–104)
ALT SERPL W P-5'-P-CCNC: 108 U/L (ref 7–52)
ANION GAP SERPL CALCULATED.3IONS-SCNC: 7 MMOL/L
AST SERPL W P-5'-P-CCNC: 47 U/L (ref 13–39)
BASOPHILS # BLD MANUAL: 0 THOUSAND/UL (ref 0–0.1)
BASOPHILS NFR MAR MANUAL: 0 % (ref 0–1)
BILIRUB SERPL-MCNC: 0.17 MG/DL (ref 0.2–1)
BUN SERPL-MCNC: 16 MG/DL (ref 5–25)
CALCIUM ALBUM COR SERPL-MCNC: 9.6 MG/DL (ref 8.3–10.1)
CALCIUM SERPL-MCNC: 9 MG/DL (ref 8.4–10.2)
CHLORIDE SERPL-SCNC: 101 MMOL/L (ref 96–108)
CO2 SERPL-SCNC: 24 MMOL/L (ref 21–32)
CREAT SERPL-MCNC: 0.95 MG/DL (ref 0.6–1.3)
EOSINOPHIL # BLD MANUAL: 0 THOUSAND/UL (ref 0–0.4)
EOSINOPHIL NFR BLD MANUAL: 0 % (ref 0–6)
ERYTHROCYTE [DISTWIDTH] IN BLOOD BY AUTOMATED COUNT: 14.6 % (ref 11.6–15.1)
GFR SERPL CREATININE-BSD FRML MDRD: 84 ML/MIN/1.73SQ M
GLUCOSE SERPL-MCNC: 102 MG/DL (ref 65–140)
HCT VFR BLD AUTO: 37.8 % (ref 34.8–46.1)
HGB BLD-MCNC: 11.7 G/DL (ref 11.5–15.4)
LYMPHOCYTES # BLD AUTO: 25 % (ref 14–44)
LYMPHOCYTES # BLD AUTO: 3.23 THOUSAND/UL (ref 0.6–4.47)
MCH RBC QN AUTO: 26.5 PG (ref 26.8–34.3)
MCHC RBC AUTO-ENTMCNC: 31 G/DL (ref 31.4–37.4)
MCV RBC AUTO: 86 FL (ref 82–98)
MONOCYTES # BLD AUTO: 0.65 THOUSAND/UL (ref 0–1.22)
MONOCYTES NFR BLD: 5 % (ref 4–12)
NEUTROPHILS # BLD MANUAL: 9.04 THOUSAND/UL (ref 1.85–7.62)
NEUTS BAND NFR BLD MANUAL: 1 % (ref 0–8)
NEUTS SEG NFR BLD AUTO: 69 % (ref 43–75)
PLATELET # BLD AUTO: 227 THOUSANDS/UL (ref 149–390)
PLATELET BLD QL SMEAR: ADEQUATE
PMV BLD AUTO: 10.7 FL (ref 8.9–12.7)
POTASSIUM SERPL-SCNC: 4.8 MMOL/L (ref 3.5–5.3)
PROT SERPL-MCNC: 6.4 G/DL (ref 6.4–8.4)
RBC # BLD AUTO: 4.42 MILLION/UL (ref 3.81–5.12)
RBC MORPH BLD: NORMAL
SODIUM SERPL-SCNC: 132 MMOL/L (ref 135–147)
WBC # BLD AUTO: 12.91 THOUSAND/UL (ref 4.31–10.16)

## 2024-01-08 PROCEDURE — 80053 COMPREHEN METABOLIC PANEL: CPT

## 2024-01-08 PROCEDURE — 85007 BL SMEAR W/DIFF WBC COUNT: CPT

## 2024-01-08 PROCEDURE — 85027 COMPLETE CBC AUTOMATED: CPT

## 2024-01-08 RX ORDER — NIFEDIPINE 30 MG/1
60 TABLET, EXTENDED RELEASE ORAL DAILY
Qty: 60 TABLET | Refills: 0 | Status: SHIPPED | OUTPATIENT
Start: 2024-01-08 | End: 2024-02-07

## 2024-01-08 RX ORDER — IBUPROFEN 600 MG/1
600 TABLET ORAL EVERY 6 HOURS
Qty: 30 TABLET | Refills: 0 | Status: SHIPPED | OUTPATIENT
Start: 2024-01-08

## 2024-01-08 RX ADMIN — LORATADINE 10 MG: 10 TABLET ORAL at 08:42

## 2024-01-08 RX ADMIN — NIFEDIPINE 60 MG: 30 TABLET, EXTENDED RELEASE ORAL at 08:42

## 2024-01-08 RX ADMIN — IBUPROFEN 600 MG: 600 TABLET ORAL at 08:41

## 2024-01-08 NOTE — PLAN OF CARE
Problem: PAIN - ADULT  Goal: Verbalizes/displays adequate comfort level or baseline comfort level  Description: Interventions:  - Encourage patient to monitor pain and request assistance  - Assess pain using appropriate pain scale  - Administer analgesics based on type and severity of pain and evaluate response  - Implement non-pharmacological measures as appropriate and evaluate response  - Consider cultural and social influences on pain and pain management  - Notify physician/advanced practitioner if interventions unsuccessful or patient reports new pain  Outcome: Progressing     Problem: INFECTION - ADULT  Goal: Absence or prevention of progression during hospitalization  Description: INTERVENTIONS:  - Assess and monitor for signs and symptoms of infection  - Monitor lab/diagnostic results  - Monitor all insertion sites, i.e. indwelling lines, tubes, and drains  - Monitor endotracheal if appropriate and nasal secretions for changes in amount and color  - [place] appropriate cooling/warming therapies per order  - Administer medications as ordered  - Instruct and encourage patient and family to use good hand hygiene technique  - Identify and instruct in appropriate isolation precautions for identified infection/condition  Outcome: Progressing  Goal: Absence of fever/infection during neutropenic period  Description: INTERVENTIONS:  - Monitor WBC    Outcome: Progressing     Problem: SAFETY ADULT  Goal: Patient will remain free of falls  Description: INTERVENTIONS:  - Educate patient/family on patient safety including physical limitations  - Instruct patient to call for assistance with activity   - Consult OT/PT to assist with strengthening/mobility   - Keep Call bell within reach  - Keep bed low and locked with side rails adjusted as appropriate  - Keep care items and personal belongings within reach  - Initiate and maintain comfort rounds  - Make Fall Risk Sign visible to staff  - Apply yellow socks and bracelet  for high fall risk patients  - Consider moving patient to room near nurses station  Outcome: Progressing  Goal: Maintain or return to baseline ADL function  Description: INTERVENTIONS:  -  Assess patient's ability to carry out ADLs; assess patient's baseline for ADL function and identify physical deficits which impact ability to perform ADLs (bathing, care of mouth/teeth, toileting, grooming, dressing, etc.)  - Assess/evaluate cause of self-care deficits   - Assess range of motion  - Assess patient's mobility; develop plan if impaired  - Assess patient's need for assistive devices and provide as appropriate  - Encourage maximum independence but intervene and supervise when necessary  - Involve family in performance of ADLs  - Assess for home care needs following discharge   - Consider OT consult to assist with ADL evaluation and planning for discharge  - Provide patient education as appropriate  Outcome: Progressing  Goal: Maintains/Returns to pre admission functional level  Description: INTERVENTIONS:  - Perform AM-PAC 6 Click Basic Mobility/ Daily Activity assessment daily.  - Set and communicate daily mobility goal to care team and patient/family/caregiver.   - Collaborate with rehabilitation services on mobility goals if consulted  - Out of bed for toileting  - Record patient progress and toleration of activity level   Outcome: Progressing     Problem: DISCHARGE PLANNING  Goal: Discharge to home or other facility with appropriate resources  Description: INTERVENTIONS:  - Identify barriers to discharge w/patient and caregiver  - Arrange for needed discharge resources and transportation as appropriate  - Identify discharge learning needs (meds, wound care, etc.)  - Arrange for interpretive services to assist at discharge as needed  - Refer to Case Management Department for coordinating discharge planning if the patient needs post-hospital services based on physician/advanced practitioner order or complex needs  related to functional status, cognitive ability, or social support system  Outcome: Progressing     Problem: NEUROSENSORY - ADULT  Goal: Achieves stable or improved neurological status  Description: INTERVENTIONS  - Monitor and report changes in neurological status  - Monitor vital signs such as temperature, blood pressure, glucose, and any other labs ordered   - Initiate measures to prevent increased intracranial pressure  - Monitor for seizure activity and implement precautions if appropriate      Outcome: Progressing  Goal: Remains free of injury related to seizures activity  Description: INTERVENTIONS  - Maintain airway, patient safety  and administer oxygen as ordered  - Monitor patient for seizure activity, document and report duration and description of seizure to physician/advanced practitioner  - If seizure occurs,  ensure patient safety during seizure  - Reorient patient post seizure  - Seizure pads on all 4 side rails  - Instruct patient/family to notify RN of any seizure activity including if an aura is experienced  - Instruct patient/family to call for assistance with activity based on nursing assessment  - Administer anti-seizure medications if ordered    Outcome: Progressing     Problem: CARDIOVASCULAR - ADULT  Goal: Maintains optimal cardiac output and hemodynamic stability  Description: INTERVENTIONS:  - Monitor I/O, vital signs and rhythm  - Monitor for S/S and trends of decreased cardiac output  - Administer and titrate ordered vasoactive medications to optimize hemodynamic stability  - Assess quality of pulses, skin color and temperature  - Assess for signs of decreased coronary artery perfusion  - Instruct patient to report change in severity of symptoms  Outcome: Progressing     Problem: POSTPARTUM  Goal: Experiences normal postpartum course  Description: INTERVENTIONS:  - Monitor maternal vital signs  - Assess uterine involution and lochia  Outcome: Progressing  Goal: Appropriate maternal -   bonding  Description: INTERVENTIONS:  - Identify family support  - Assess for appropriate maternal/infant bonding   -Encourage maternal/infant bonding opportunities  - Referral to  or  as needed  Outcome: Progressing  Goal: Establishment of infant feeding pattern  Description: INTERVENTIONS:  - Assess breast/bottle feeding  - Refer to lactation as needed  Outcome: Progressing  Goal: Incision(s), wounds(s) or drain site(s) healing without S/S of infection  Description: INTERVENTIONS  - Assess and document dressing, incision, wound bed, drain sites and surrounding tissue  - Provide patient and family education  - Perform skin care/dressing changes every   Outcome: Progressing

## 2024-01-08 NOTE — PROGRESS NOTES
Obstetrics Progress Note  Monisha Liu 23 y.o. female MRN: [id number]  Unit/Bed#: -01 Encounter: [id number]    Assessment/Plan:  Postpartum Day #2 s/p  at 32w3d with labor course complicated by preeclampsia with severe features concerning for developing HELPP, now s/p Mag. Baby in NICU. By issue:    *  (spontaneous vaginal delivery)  Assessment & Plan  Lochia WNL   Recovering well   Appropriate bowel and bladder function   Pain well controlled   Tolerating diet   Ambulating without issues   No lower extremity tenderness  GBS negative   Rh positive   Dispo: consider d/c home PPD2 (today) if pressures and labs remain stable    Preeclampsia, severe, third trimester  Assessment & Plan  : SRBPs treated with 20 mg IV labetalol, CBC wnl, CMP with Cr 0.8, AST and ALT wnl, P/C 0.4  Systolic (24hrs), Av , Min:121 , Max:152   Diastolic (24hrs), Av, Min:67, Max:95  S/p 24 hr magnesium  PO Regimen: Procardia 60 XL qd    Pressures in last 24hours:   Systolic (24hrs), Av , Min:111 , Max:136   Diastolic (24hrs), Av, Min:65, Max:88  Currently asymptomatic  Continue to trend labs and pressures    Platelet        Results from last 7 days   Lab Units 24  0545 24  1601 24  1010 24  0404 24  1420   PLATELETS Thousands/uL 213 223 251 273 278 318 318   , Renal      Results from last 7 days   Lab Units 24  0532 24  1601 24  1010 24  0404 24  1420   BUN mg/dL 17 15 14 14 14 13 13   CREATININE mg/dL 1.06 0.94 1.08 0.89 0.88 0.90 0.88   EGFR ml/min/1.73sq m 74 85 72 91 92 90 92   , or LFT's       Results from last 7 days   Lab Units 24  0532 24  1601 24  1010 24  0404 24  2037 24  1420   AST U/L 96* 126* 186* 240* 285* 289* 144*   ALT U/L 157* 192* 249* 297* 315* 310* 164*   ALK PHOS U/L 196* 215* 242* 236* 248* 279* 288*   TOTAL PROTEIN g/dL  5.7* 6.0* 6.5 6.5 6.5 6.9 6.9   ALBUMIN g/dL 3.0* 3.1* 3.4* 3.4* 3.4* 3.6 3.6   TOTAL BILIRUBIN mg/dL 0.18* 0.19* 0.19* 0.22 0.21 0.19* 0.18*        Nonimmune to hepatitis B virus  Assessment & Plan  Vaccine series PP        Subjective/Objective     Subjective:   No acute events overnight. Pain: controlled. Tolerating PO: yes. Voiding: yes. Flatus: passing. Ambulating: yes. Chest pain: no. Shortness of breath: no. Leg pain: no. Lochia: within normal limits. Baby in NICU.    Objective:   Vitals:   Temp:  [97.7 °F (36.5 °C)-98.8 °F (37.1 °C)] 98.3 °F (36.8 °C)  HR:  [] 86  Resp:  [17-20] 18  BP: (111-136)/(65-88) 114/73     No intake or output data in the 24 hours ending 01/08/24 0615    Lab Results   Component Value Date    WBC 12.91 (H) 01/08/2024    HGB 11.7 01/08/2024    HCT 37.8 01/08/2024    MCV 86 01/08/2024     01/08/2024       Physical Exam:   General: alert and oriented x3, in no apparent distress  Cardiovascular: regular rate  Pulmonary: normal effort  Abdomen: Soft, non-tender, non-distended, no rebound or guarding. Uterine fundus firm and non-tender, at the umbilicus  Extremities: Non tender with no edema      Marietta Aviles MD  PGY-I, OBGYN  1/8/2024, 6:15 AM

## 2024-01-08 NOTE — LACTATION NOTE
CONSULT - LACTATION  Monisha Liu 23 y.o. female MRN: [id number]    [facility] AN L&D Room / Bed: / 303-01 Encounter: [id number]    Maternal Information     MOTHER:  N/A  Maternal Age: This patient's mother is not on file.  OB History: This patient's mother is not on file.  Previouse breast reduction surgery? No    Lactation history:   Has patient previously breast fed: No   How long had patient previously breast fed:     Previous breast feeding complications:     This patient's mother is not on file.    Birth information:  YOB: 2000   Time of birth:     Sex: female   Delivery type:     Birth Weight: No birth weight on file.   Percent of Weight Change: Birth weight not on file     Gestational Age: <None>   [unfilled]    Assessment        01/08/24 1000   Lactation Consultation   Reason for Consult 10 minutes;10 minute   Risk Factors NICU infant   Breasts/Nipples   Left Breast Soft   Right Breast Soft   Left Nipple Everted   Right Nipple Everted   Intervention Hand expression;Breast pump   Breastfeeding Status Yes   Breastfeeding Progress Pumping only   Reasons for not Breastfeeding Infant medical condition   Other OB Lactation Tools   Feeding Devices Pump;Syringe   Breast Pump   Pump 2   Pump Review/Education Setup, frequency, and cleaning;Milk storage   Patient Follow-Up   Lactation Consult Status 2   Follow-Up Type Inpatient;Call as needed   Other OB Lactation Documentation    Additional Problem Noted Mom pumping for baby in NICU. Pumping for 10 minutes to stimulate breasts and then doing hand expressoin. Getting 2 mLs. Pumping every 2-3 hours. Reviewed breast milk storage guidelines. Education on preventing engorgement.  (D/C booklet reviewed other day, mom has no questions.)       Feeding recommendations:  pump every 2-3 hours  Mom pumping for baby in NICU. Mom states she is pumping for 10 minutes to stimulate breasts and then doing hand expression  afterwards. Getting 2 mLs. Pumping every 2-3 hours. Reviewed pumping instructions. Reviewed breast milk storage guidelines. Education on preventing engorgement. Denies any questions or concerns.     Mom provided with and discussed RBS, Hand expression/2nd night handout and increase supply for NICU baby. Reviewed pumping log and expectations for pumping output in the first week. Reviewed cycle pumping and appropriate pump settings, as well as pumping for 10-15 min 8-12 times per day.       Enc Mom to discussed putting baby to the breast with the NICU team when baby is medically stable to do so. Enc her to call for lactation support as needed throughout her stay.     Met with mother to go over discharge breastfeeding booklet including the feeding log. Emphasized 8 or more (12) feedings in a 24 hour period, what to expect for the number of diapers per day of life and the progression of properties of the  stooling pattern.    List of reasons to call a lactation consultant.  Feeding logs  Feeding cues  Hand expression  Baby's Second day (cluster feeding)  Breastfeeding and Your Lifestyle (Medications, Alcohol, Caffeine, Smoking, Street Drugs, Methadone)  First Two Weeks Survival Guide for Breastfeeding  Breast Changes  Physical Therapy  Storage and Handling of Breast milk  How to Keep Your Breast Pump Kit Clean  The Employed Breastfeeding Mother  Mixed feeding  Bottle feeding like breastfeeding (paced bottle feeding)  astfeeding and your lifestyle, storage and preparation of breast milk, how to keep you breast pump clean, the employed breastfeeding mother and paced bottle feeding handouts.     Booklet included Breastfeeding Resources for after discharge including access to the number for the [facility].    Marielena Fisher 2024 10:04 AM

## 2024-01-08 NOTE — PLAN OF CARE
Problem: PAIN - ADULT  Goal: Verbalizes/displays adequate comfort level or baseline comfort level  Description: Interventions:  - Encourage patient to monitor pain and request assistance  - Assess pain using appropriate pain scale  - Administer analgesics based on type and severity of pain and evaluate response  - Implement non-pharmacological measures as appropriate and evaluate response  - Consider cultural and social influences on pain and pain management  - Notify physician/advanced practitioner if interventions unsuccessful or patient reports new pain  Outcome: Progressing     Problem: INFECTION - ADULT  Goal: Absence or prevention of progression during hospitalization  Description: INTERVENTIONS:  - Assess and monitor for signs and symptoms of infection  - Monitor lab/diagnostic results  - Monitor all insertion sites, i.e. indwelling lines, tubes, and drains  - Monitor endotracheal if appropriate and nasal secretions for changes in amount and color  - [place] appropriate cooling/warming therapies per order  - Administer medications as ordered  - Instruct and encourage patient and family to use good hand hygiene technique  - Identify and instruct in appropriate isolation precautions for identified infection/condition  Outcome: Progressing  Goal: Absence of fever/infection during neutropenic period  Description: INTERVENTIONS:  - Monitor WBC    Outcome: Progressing     Problem: SAFETY ADULT  Goal: Patient will remain free of falls  Description: INTERVENTIONS:  - Educate patient/family on patient safety including physical limitations  - Instruct patient to call for assistance with activity   - Consult OT/PT to assist with strengthening/mobility   - Keep Call bell within reach  - Keep bed low and locked with side rails adjusted as appropriate  - Keep care items and personal belongings within reach  - Initiate and maintain comfort rounds  - Make Fall Risk Sign visible to staff  - Apply yellow socks and bracelet  for high fall risk patients  - Consider moving patient to room near nurses station  Outcome: Progressing  Goal: Maintain or return to baseline ADL function  Description: INTERVENTIONS:  -  Assess patient's ability to carry out ADLs; assess patient's baseline for ADL function and identify physical deficits which impact ability to perform ADLs (bathing, care of mouth/teeth, toileting, grooming, dressing, etc.)  - Assess/evaluate cause of self-care deficits   - Assess range of motion  - Assess patient's mobility; develop plan if impaired  - Assess patient's need for assistive devices and provide as appropriate  - Encourage maximum independence but intervene and supervise when necessary  - Involve family in performance of ADLs  - Assess for home care needs following discharge   - Consider OT consult to assist with ADL evaluation and planning for discharge  - Provide patient education as appropriate  Outcome: Progressing  Goal: Maintains/Returns to pre admission functional level  Description: INTERVENTIONS:  - Perform AM-PAC 6 Click Basic Mobility/ Daily Activity assessment daily.  - Set and communicate daily mobility goal to care team and patient/family/caregiver.   - Collaborate with rehabilitation services on mobility goals if consulted  - Out of bed for toileting  - Record patient progress and toleration of activity level   Outcome: Progressing     Problem: DISCHARGE PLANNING  Goal: Discharge to home or other facility with appropriate resources  Description: INTERVENTIONS:  - Identify barriers to discharge w/patient and caregiver  - Arrange for needed discharge resources and transportation as appropriate  - Identify discharge learning needs (meds, wound care, etc.)  - Arrange for interpretive services to assist at discharge as needed  - Refer to Case Management Department for coordinating discharge planning if the patient needs post-hospital services based on physician/advanced practitioner order or complex needs  related to functional status, cognitive ability, or social support system  Outcome: Progressing     Problem: POSTPARTUM  Goal: Experiences normal postpartum course  Description: INTERVENTIONS:  - Monitor maternal vital signs  - Assess uterine involution and lochia  Outcome: Progressing  Goal: Appropriate maternal -  bonding  Description: INTERVENTIONS:  - Identify family support  - Assess for appropriate maternal/infant bonding   -Encourage maternal/infant bonding opportunities  - Referral to  or  as needed  Outcome: Progressing  Goal: Establishment of infant feeding pattern  Description: INTERVENTIONS:  - Assess breast/bottle feeding  - Refer to lactation as needed  Outcome: Progressing  Goal: Incision(s), wounds(s) or drain site(s) healing without S/S of infection  Description: INTERVENTIONS  - Assess and document dressing, incision, wound bed, drain sites and surrounding tissue  - Provide patient and family education  - Perform skin care/dressing changes every   Outcome: Progressing     Problem: NEUROSENSORY - ADULT  Goal: Achieves stable or improved neurological status  Description: INTERVENTIONS  - Monitor and report changes in neurological status  - Monitor vital signs such as temperature, blood pressure, glucose, and any other labs ordered   - Initiate measures to prevent increased intracranial pressure  - Monitor for seizure activity and implement precautions if appropriate      Outcome: Progressing  Goal: Remains free of injury related to seizures activity  Description: INTERVENTIONS  - Maintain airway, patient safety  and administer oxygen as ordered  - Monitor patient for seizure activity, document and report duration and description of seizure to physician/advanced practitioner  - If seizure occurs,  ensure patient safety during seizure  - Reorient patient post seizure  - Seizure pads on all 4 side rails  - Instruct patient/family to notify RN of any seizure activity  including if an aura is experienced  - Instruct patient/family to call for assistance with activity based on nursing assessment  - Administer anti-seizure medications if ordered    Outcome: Progressing     Problem: CARDIOVASCULAR - ADULT  Goal: Maintains optimal cardiac output and hemodynamic stability  Description: INTERVENTIONS:  - Monitor I/O, vital signs and rhythm  - Monitor for S/S and trends of decreased cardiac output  - Administer and titrate ordered vasoactive medications to optimize hemodynamic stability  - Assess quality of pulses, skin color and temperature  - Assess for signs of decreased coronary artery perfusion  - Instruct patient to report change in severity of symptoms  Outcome: Progressing

## 2024-01-08 NOTE — LACTATION NOTE
Met with Monisha who is pumping for her baby boy in NICU.     Mom provided with and discussed Ready Set Baby, Hand expression and increasing supply for NICU baby handouts. Reviewed pumping log and expectations for pumping output in the first week. Reviewed cycle pumping and appropriate pump settings, as well as pumping for 10-15 min 8-12 times per day.       Encouraged Mom to discuss putting baby to the breast with the NICU team when baby is medically stable to do so. Encouraged her to call for lactation support as needed throughout her stay.     Monisha does have a breast pump for home.     Encouraged her to call with additional questions and for breastfeeding/pumping support as needed. Phone number provided.

## 2024-01-08 NOTE — CASE MANAGEMENT
Case Management Progress Note    Patient name Monisha Liu  Location /-01 MRN [id number]  : 2000 Date 2024       LOS (days): 8  Geometric Mean LOS (GMLOS) (days):   Days to GMLOS:        OBJECTIVE:        Current admission status: Inpatient  Preferred Pharmacy:   [facility]/pharmacy #[id number] - [facility] [address] [place] [address]  [address] [facility] [address]  Phone: [phone] Fax: [phone]    Primary Care Provider: Fabby Fraser MD    Primary Insurance: BLUE CROSS  Secondary Insurance:     PROGRESS NOTE:      CM met with MOB to introduce CM services, complete assessment, and provide CM contact info.      MOB reported the following:    Assessment:  Consult reason: NICU Admission  Gestational Age at Birth: 32 Weeks + 2 Days  MOB Name (& age if teen):   Monisha  FOMANDY Name (& age if teen MOB):   Fiance   Other Legal Guardian(s) for Baby:    None  Other Children:   1 year old daughter   Housing Plan/Lives with:     Insurance Coverage/Plan for Baby: MOB on parent's insurance, Baby not covered beyond first 30/31 days. MOB reported her aunt is assisting in applying baby for Medicaid in [place] where they primarily live.   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  NICU Resources: NICU Packet Provided    Discharge Plan:  Pediatrician:   TBD  Prenatal/ Care:  TBD  Follow-Up  Appointments Needed/Scheduled: TBD  Medications/DME/Other Referrals: TBD  Transportation Plan: DAVID has a vehicle    Follow-Up Needed from Care Management:     CM met with MOB bedside provided NICU packet and resources.  MOB reported she primarily lives in [place] with FOB and 1 year old daughter, she was here visiting her mother and family for the holidays when she was hospitalized.  MOB reported family support while baby remains in NICU.  No needs at this time, CM will be available if any future needs.

## 2024-01-08 NOTE — PLAN OF CARE
Problem: PAIN - ADULT  Goal: Verbalizes/displays adequate comfort level or baseline comfort level  Description: Interventions:  - Encourage patient to monitor pain and request assistance  - Assess pain using appropriate pain scale  - Administer analgesics based on type and severity of pain and evaluate response  - Implement non-pharmacological measures as appropriate and evaluate response  - Consider cultural and social influences on pain and pain management  - Notify physician/advanced practitioner if interventions unsuccessful or patient reports new pain  1/8/2024 1626 by Chanel Hawk RN  Outcome: Completed  1/8/2024 0906 by Chanel Hawk RN  Outcome: Progressing     Problem: INFECTION - ADULT  Goal: Absence or prevention of progression during hospitalization  Description: INTERVENTIONS:  - Assess and monitor for signs and symptoms of infection  - Monitor lab/diagnostic results  - Monitor all insertion sites, i.e. indwelling lines, tubes, and drains  - Monitor endotracheal if appropriate and nasal secretions for changes in amount and color  - [place] appropriate cooling/warming therapies per order  - Administer medications as ordered  - Instruct and encourage patient and family to use good hand hygiene technique  - Identify and instruct in appropriate isolation precautions for identified infection/condition  1/8/2024 1626 by Chanel Hawk RN  Outcome: Completed  1/8/2024 0906 by Chanel Hawk RN  Outcome: Progressing  Goal: Absence of fever/infection during neutropenic period  Description: INTERVENTIONS:  - Monitor WBC    1/8/2024 1626 by Chanel Hawk RN  Outcome: Completed  1/8/2024 0906 by Chanel Hawk RN  Outcome: Progressing     Problem: SAFETY ADULT  Goal: Patient will remain free of falls  Description: INTERVENTIONS:  - Educate patient/family on patient safety including physical limitations  - Instruct patient to call for assistance with activity   - Consult OT/PT to assist with  strengthening/mobility   - Keep Call bell within reach  - Keep bed low and locked with side rails adjusted as appropriate  - Keep care items and personal belongings within reach  - Initiate and maintain comfort rounds  - Make Fall Risk Sign visible to staff  - Apply yellow socks and bracelet for high fall risk patients  - Consider moving patient to room near nurses station  1/8/2024 1626 by Chanel Hawk RN  Outcome: Completed  1/8/2024 0906 by Chanel Hawk RN  Outcome: Progressing  Goal: Maintain or return to baseline ADL function  Description: INTERVENTIONS:  -  Assess patient's ability to carry out ADLs; assess patient's baseline for ADL function and identify physical deficits which impact ability to perform ADLs (bathing, care of mouth/teeth, toileting, grooming, dressing, etc.)  - Assess/evaluate cause of self-care deficits   - Assess range of motion  - Assess patient's mobility; develop plan if impaired  - Assess patient's need for assistive devices and provide as appropriate  - Encourage maximum independence but intervene and supervise when necessary  - Involve family in performance of ADLs  - Assess for home care needs following discharge   - Consider OT consult to assist with ADL evaluation and planning for discharge  - Provide patient education as appropriate  1/8/2024 1626 by Chanel Hawk RN  Outcome: Completed  1/8/2024 0906 by Chanel Hawk RN  Outcome: Progressing  Goal: Maintains/Returns to pre admission functional level  Description: INTERVENTIONS:  - Perform AM-PAC 6 Click Basic Mobility/ Daily Activity assessment daily.  - Set and communicate daily mobility goal to care team and patient/family/caregiver.   - Collaborate with rehabilitation services on mobility goals if consulted  - Out of bed for toileting  - Record patient progress and toleration of activity level   1/8/2024 1626 by Chanel Hawk RN  Outcome: Completed  1/8/2024 0906 by Chanel Hawk RN  Outcome: Progressing      Problem: DISCHARGE PLANNING  Goal: Discharge to home or other facility with appropriate resources  Description: INTERVENTIONS:  - Identify barriers to discharge w/patient and caregiver  - Arrange for needed discharge resources and transportation as appropriate  - Identify discharge learning needs (meds, wound care, etc.)  - Arrange for interpretive services to assist at discharge as needed  - Refer to Case Management Department for coordinating discharge planning if the patient needs post-hospital services based on physician/advanced practitioner order or complex needs related to functional status, cognitive ability, or social support system  1/8/2024 1626 by Chanel Hawk RN  Outcome: Completed  1/8/2024 0906 by Chanel Hawk RN  Outcome: Progressing     Problem: NEUROSENSORY - ADULT  Goal: Achieves stable or improved neurological status  Description: INTERVENTIONS  - Monitor and report changes in neurological status  - Monitor vital signs such as temperature, blood pressure, glucose, and any other labs ordered   - Initiate measures to prevent increased intracranial pressure  - Monitor for seizure activity and implement precautions if appropriate      1/8/2024 1626 by Chanel Hawk RN  Outcome: Completed  1/8/2024 0906 by Chanel Hawk RN  Outcome: Progressing  Goal: Remains free of injury related to seizures activity  Description: INTERVENTIONS  - Maintain airway, patient safety  and administer oxygen as ordered  - Monitor patient for seizure activity, document and report duration and description of seizure to physician/advanced practitioner  - If seizure occurs,  ensure patient safety during seizure  - Reorient patient post seizure  - Seizure pads on all 4 side rails  - Instruct patient/family to notify RN of any seizure activity including if an aura is experienced  - Instruct patient/family to call for assistance with activity based on nursing assessment  - Administer anti-seizure medications if  ordered    2024 by Chanel Hawk RN  Outcome: Completed  2024 by Chanel Hawk RN  Outcome: Progressing     Problem: CARDIOVASCULAR - ADULT  Goal: Maintains optimal cardiac output and hemodynamic stability  Description: INTERVENTIONS:  - Monitor I/O, vital signs and rhythm  - Monitor for S/S and trends of decreased cardiac output  - Administer and titrate ordered vasoactive medications to optimize hemodynamic stability  - Assess quality of pulses, skin color and temperature  - Assess for signs of decreased coronary artery perfusion  - Instruct patient to report change in severity of symptoms  2024 by Chanel Hawk RN  Outcome: Completed  2024 by Chanel Hawk RN  Outcome: Progressing     Problem: POSTPARTUM  Goal: Experiences normal postpartum course  Description: INTERVENTIONS:  - Monitor maternal vital signs  - Assess uterine involution and lochia  2024 by Chanel Hawk RN  Outcome: Completed  2024 by Chanel Hawk RN  Outcome: Progressing  Goal: Appropriate maternal -  bonding  Description: INTERVENTIONS:  - Identify family support  - Assess for appropriate maternal/infant bonding   -Encourage maternal/infant bonding opportunities  - Referral to  or  as needed  2024 by Chanel Hawk RN  Outcome: Completed  2024 by Chanel Hawk RN  Outcome: Progressing  Goal: Establishment of infant feeding pattern  Description: INTERVENTIONS:  - Assess breast/bottle feeding  - Refer to lactation as needed  2024 by Chanel Hawk RN  Outcome: Completed  2024 by Chanel Hawk RN  Outcome: Progressing  Goal: Incision(s), wounds(s) or drain site(s) healing without S/S of infection  Description: INTERVENTIONS  - Assess and document dressing, incision, wound bed, drain sites and surrounding tissue  - Provide patient and family education  - Perform skin care/dressing changes  every  1/8/2024 1626 by Chanel Hawk, RN  Outcome: Completed  1/8/2024 0906 by Chanel Hawk RN  Outcome: Progressing

## 2024-01-10 ENCOUNTER — HOSPITAL ENCOUNTER (EMERGENCY)
Facility: HOSPITAL | Age: 24
Discharge: HOME | End: 2024-01-10
Attending: EMERGENCY MEDICINE
Payer: COMMERCIAL

## 2024-01-10 ENCOUNTER — APPOINTMENT (EMERGENCY)
Dept: RADIOLOGY | Facility: HOSPITAL | Age: 24
End: 2024-01-10
Payer: COMMERCIAL

## 2024-01-10 VITALS
TEMPERATURE: 97.7 F | DIASTOLIC BLOOD PRESSURE: 80 MMHG | HEART RATE: 111 BPM | RESPIRATION RATE: 16 BRPM | SYSTOLIC BLOOD PRESSURE: 123 MMHG | OXYGEN SATURATION: 99 %

## 2024-01-10 DIAGNOSIS — J10.1 INFLUENZA B: Primary | ICD-10-CM

## 2024-01-10 LAB
FLUAV RNA RESP QL NAA+PROBE: NEGATIVE
FLUBV RNA RESP QL NAA+PROBE: POSITIVE
RSV RNA RESP QL NAA+PROBE: NEGATIVE
SARS-COV-2 RNA RESP QL NAA+PROBE: NEGATIVE

## 2024-01-10 PROCEDURE — 99284 EMERGENCY DEPT VISIT MOD MDM: CPT | Performed by: EMERGENCY MEDICINE

## 2024-01-10 PROCEDURE — 0241U HB NFCT DS VIR RESP RNA 4 TRGT: CPT | Performed by: EMERGENCY MEDICINE

## 2024-01-10 PROCEDURE — 88307 TISSUE EXAM BY PATHOLOGIST: CPT | Performed by: PATHOLOGY

## 2024-01-10 PROCEDURE — 99283 EMERGENCY DEPT VISIT LOW MDM: CPT

## 2024-01-10 PROCEDURE — 71045 X-RAY EXAM CHEST 1 VIEW: CPT

## 2024-01-10 PROCEDURE — 94640 AIRWAY INHALATION TREATMENT: CPT

## 2024-01-10 RX ORDER — GUAIFENESIN/DEXTROMETHORPHAN 100-10MG/5
10 SYRUP ORAL ONCE
Status: COMPLETED | OUTPATIENT
Start: 2024-01-10 | End: 2024-01-10

## 2024-01-10 RX ORDER — OSELTAMIVIR PHOSPHATE 75 MG/1
75 CAPSULE ORAL EVERY 12 HOURS
Qty: 10 CAPSULE | Refills: 0 | Status: SHIPPED | OUTPATIENT
Start: 2024-01-10 | End: 2024-01-15

## 2024-01-10 RX ORDER — OSELTAMIVIR PHOSPHATE 75 MG/1
75 CAPSULE ORAL ONCE
Status: DISCONTINUED | OUTPATIENT
Start: 2024-01-10 | End: 2024-01-10 | Stop reason: HOSPADM

## 2024-01-10 RX ORDER — IPRATROPIUM BROMIDE AND ALBUTEROL SULFATE 2.5; .5 MG/3ML; MG/3ML
3 SOLUTION RESPIRATORY (INHALATION) ONCE
Status: COMPLETED | OUTPATIENT
Start: 2024-01-10 | End: 2024-01-10

## 2024-01-10 RX ORDER — ALBUTEROL SULFATE 90 UG/1
2 AEROSOL, METERED RESPIRATORY (INHALATION) EVERY 6 HOURS PRN
Qty: 6.7 G | Refills: 0 | Status: SHIPPED | OUTPATIENT
Start: 2024-01-10

## 2024-01-10 RX ORDER — GUAIFENESIN/DEXTROMETHORPHAN 100-10MG/5
5 SYRUP ORAL 3 TIMES DAILY PRN
Qty: 118 ML | Refills: 0 | Status: SHIPPED | OUTPATIENT
Start: 2024-01-10

## 2024-01-10 RX ADMIN — GUAIFENESIN AND DEXTROMETHORPHAN 10 ML: 100; 10 SYRUP ORAL at 18:22

## 2024-01-10 RX ADMIN — IPRATROPIUM BROMIDE AND ALBUTEROL SULFATE 3 ML: 2.5; .5 SOLUTION RESPIRATORY (INHALATION) at 18:21

## 2024-01-10 NOTE — ED PROVIDER NOTES
History  Chief Complaint   Patient presents with    Cold Like Symptoms     Pt reports a cough, nasal congestion, SOB since New Years. Pt recently delivered on jan 6      23-year-old female comes in for evaluation of flulike symptoms.  Patient reports cough nasal congestion shortness of breath since New Year's.  She had a baby that was born premature and placed in the NICU on January 6.  Patient's other daughter who is 17 months old was diagnosed with influenza B on January 5.      History provided by:  Patient and medical records   used: No    Flu Symptoms  Presenting symptoms: cough and shortness of breath    Presenting symptoms: no fever, no rhinorrhea, no sore throat and no vomiting    Severity:  Moderate  Onset quality:  Gradual  Duration:  10 days  Progression:  Worsening  Chronicity:  New  Ineffective treatments:  None tried  Associated symptoms: chills and nasal congestion    Associated symptoms: no ear pain    Risk factors: sick contacts        Prior to Admission Medications   Prescriptions Last Dose Informant Patient Reported? Taking?   Junel FE 1/20 1-20 MG-MCG per tablet   No No   Sig: TAKE 1 TABLET BY MOUTH EVERY DAY   Patient not taking: Reported on 12/31/2023   NIFEdipine (PROCARDIA XL) 30 mg 24 hr tablet   No No   Sig: Take 2 tablets (60 mg total) by mouth daily   albuterol (PROVENTIL HFA,VENTOLIN HFA) 90 mcg/act inhaler   Yes No   Sig: Inhale 2 puffs every 6 (six) hours as needed for wheezing.   albuterol (PROVENTIL HFA,VENTOLIN HFA) 90 mcg/act inhaler   No Yes   Sig: Inhale 2 puffs every 6 (six) hours as needed for wheezing   benzocaine-menthol-lanolin-aloe (DERMOPLAST) 20-0.5 % topical spray   No No   Sig: Apply 1 Application topically every 6 (six) hours as needed for mild pain or irritation   fluticasone (FLOVENT DISKUS) 50 MCG/BLIST diskus inhaler   Yes No   Sig: Inhale 1 puff 2 (two) times a day.   ibuprofen (MOTRIN) 600 mg tablet   No No   Sig: Take 1 tablet (600 mg total)  by mouth every 6 (six) hours   loratadine (CLARITIN) 10 mg tablet   Yes No   Sig: Take 10 mg by mouth as needed     witch hazel-glycerin (TUCKS) topical pad   No No   Sig: Apply 1 Pad topically every 4 (four) hours as needed for irritation      Facility-Administered Medications: None       Past Medical History:   Diagnosis Date    Asthma     Dysmenorrhea     Iron deficiency anemia     Menorrhagia     Migraine        History reviewed. No pertinent surgical history.    Family History   Problem Relation Age of Onset    Diabetes Mother     Hypertension Mother      I have reviewed and agree with the history as documented.    E-Cigarette/Vaping    E-Cigarette Use Never User      E-Cigarette/Vaping Substances    Nicotine No     THC No     CBD No     Flavoring No     Other No     Unknown No      Social History     Tobacco Use    Smoking status: Never    Smokeless tobacco: Never   Vaping Use    Vaping status: Never Used   Substance Use Topics    Alcohol use: No    Drug use: No       Review of Systems   Constitutional:  Positive for chills. Negative for fever.   HENT:  Positive for congestion. Negative for ear pain, rhinorrhea and sore throat.    Eyes:  Negative for pain and visual disturbance.   Respiratory:  Positive for cough and shortness of breath.    Cardiovascular:  Negative for chest pain and palpitations.   Gastrointestinal:  Negative for abdominal pain and vomiting.   Genitourinary:  Negative for dysuria and hematuria.   Musculoskeletal:  Negative for arthralgias and back pain.   Skin:  Negative for color change and rash.   Neurological:  Negative for seizures and syncope.   All other systems reviewed and are negative.      Physical Exam  Physical Exam  Vitals and nursing note reviewed.   Constitutional:       General: She is not in acute distress.     Appearance: She is well-developed.   HENT:      Head: Normocephalic and atraumatic.      Nose: Congestion and rhinorrhea present.   Eyes:      Conjunctiva/sclera:  Conjunctivae normal.   Cardiovascular:      Rate and Rhythm: Normal rate and regular rhythm.      Heart sounds: No murmur heard.  Pulmonary:      Effort: Pulmonary effort is normal. No respiratory distress.      Breath sounds: Normal breath sounds.   Abdominal:      Palpations: Abdomen is soft.      Tenderness: There is no abdominal tenderness.   Musculoskeletal:         General: No swelling.      Cervical back: Neck supple.   Skin:     General: Skin is warm and dry.      Capillary Refill: Capillary refill takes less than 2 seconds.   Neurological:      Mental Status: She is alert.   Psychiatric:         Mood and Affect: Mood normal.         Vital Signs  ED Triage Vitals [01/10/24 1739]   Temperature Pulse Respirations Blood Pressure SpO2   97.7 °F (36.5 °C) (!) 111 16 123/80 99 %      Temp Source Heart Rate Source Patient Position - Orthostatic VS BP Location FiO2 (%)   Oral Monitor Sitting Right arm --      Pain Score       --           Vitals:    01/10/24 1739   BP: 123/80   Pulse: (!) 111   Patient Position - Orthostatic VS: Sitting         Visual Acuity      ED Medications  Medications   oseltamivir (TAMIFLU) capsule 75 mg (75 mg Oral Not Given 1/10/24 2006)   ipratropium-albuterol (DUO-NEB) 0.5-2.5 mg/3 mL inhalation solution 3 mL (3 mL Nebulization Given 1/10/24 1821)   dextromethorphan-guaiFENesin (ROBITUSSIN DM) oral syrup 10 mL (10 mL Oral Given 1/10/24 1822)       Diagnostic Studies  Results Reviewed       Procedure Component Value Units Date/Time    FLU/RSV/COVID - if FLU/RSV clinically relevant [[id number]]  (Abnormal) Collected: 01/10/24 1818    Lab Status: Final result Specimen: Nares from Nose Updated: 01/10/24 1917     SARS-CoV-2 Negative     INFLUENZA A PCR Negative     INFLUENZA B PCR Positive     RSV PCR Negative    Narrative:      FOR PEDIATRIC PATIENTS - copy/paste COVID Guidelines URL to browser: [email or web address]    SARS-CoV-2 assay is a  Nucleic Acid Amplification assay intended for the  qualitative detection of nucleic acid from SARS-CoV-2 in nasopharyngeal  swabs. Results are for the presumptive identification of SARS-CoV-2 RNA.    Positive results are indicative of infection with SARS-CoV-2, the virus  causing COVID-19, but do not rule out bacterial infection or co-infection  with other viruses. Laboratories within the [place] and its  territories are required to report all positive results to the appropriate  public health authorities. Negative results do not preclude SARS-CoV-2  infection and should not be used as the sole basis for treatment or other  patient management decisions. Negative results must be combined with  clinical observations, patient history, and epidemiological information.  This test has not been FDA cleared or approved.    This test has been authorized by FDA under an Emergency Use Authorization  (EUA). This test is only authorized for the duration of time the  declaration that circumstances exist justifying the authorization of the  emergency use of an in vitro diagnostic tests for detection of SARS-CoV-2  virus and/or diagnosis of COVID-19 infection under section 564(b)(1) of  the Act, 21 U.S.C. 360bbb-3(b)(1), unless the authorization is terminated  or revoked sooner. The test has been validated but independent review by FDA  and CLIA is pending.    Test performed using [employer] GeneXpert: This RT-PCR assay targets N2,  a region unique to SARS-CoV-2. A conserved region in the E-gene was chosen  for pan-Sarbecovirus detection which includes SARS-CoV-2.    According to CMS-2020-01-R, this platform meets the definition of high-throughput technology.                   XR chest 1 view portable    (Results Pending)              Procedures  Procedures         ED Course                               SBIRT 20yo+      Flowsheet Row Most Recent Value   Initial Alcohol Screen: US AUDIT-C     1. How often do you have a drink  containing alcohol? 0 Filed at: 01/10/2024 1739   2. How many drinks containing alcohol do you have on a typical day you are drinking?  0 Filed at: 01/10/2024 1739   3b. FEMALE Any Age, or MALE 65+: How often do you have 4 or more drinks on one occassion? 0 Filed at: 01/10/2024 1739   Audit-C Score 0 Filed at: 01/10/2024 1739   JANKI: How many times in the past year have you...    Used an illegal drug or used a prescription medication for non-medical reasons? Never Filed at: 01/10/2024 1739                      Medical Decision Making  Differential diagnosis includes but is not limited to viral illness, COVID, flu, RSV    Problems Addressed:  Influenza B: acute illness or injury    Amount and/or Complexity of Data Reviewed  External Data Reviewed: notes.     Details: Reviewed patient's hospital stay and delivery on January 6  Radiology: ordered.    Risk  OTC drugs.  Prescription drug management.             Disposition  Final diagnoses:   Influenza B     Time reflects when diagnosis was documented in both MDM as applicable and the Disposition within this note       Time User Action Codes Description Comment    1/10/2024  7:35 PM Nadia Jimenes Add [J10.1] Influenza B           ED Disposition       ED Disposition   Discharge    Condition   Stable    Date/Time   Wed William 10, 2024 1936    Comment   Monisha Liu discharge to home/self care.                   Follow-up Information       Follow up With Specialties Details Why Contact Info Additional Information    [facility] Schedule an appointment as soon as possible for a visit   [address] [place] [id number] [phone] [address], [id number] [phone]            Discharge Medication List as of 1/10/2024  7:36 PM        START taking these medications    Details   dextromethorphan-guaiFENesin (ROBITUSSIN DM)  mg/5 mL syrup Take 5 mL by mouth 3  (three) times a day as needed for congestion or cough, Starting Wed 1/10/2024, Normal      oseltamivir (TAMIFLU) 75 mg capsule Take 1 capsule (75 mg total) by mouth every 12 (twelve) hours for 5 days, Starting Wed 1/10/2024, Until Mon 1/15/2024, Normal           CONTINUE these medications which have CHANGED    Details   albuterol (PROVENTIL HFA,VENTOLIN HFA) 90 mcg/act inhaler Inhale 2 puffs every 6 (six) hours as needed for wheezing, Starting Wed 1/10/2024, Normal           CONTINUE these medications which have NOT CHANGED    Details   benzocaine-menthol-lanolin-aloe (DERMOPLAST) 20-0.5 % topical spray Apply 1 Application topically every 6 (six) hours as needed for mild pain or irritation, Starting Mon 1/8/2024, No Print      fluticasone (FLOVENT DISKUS) 50 MCG/BLIST diskus inhaler Inhale 1 puff 2 (two) times a day., Until Discontinued, Historical Med      ibuprofen (MOTRIN) 600 mg tablet Take 1 tablet (600 mg total) by mouth every 6 (six) hours, Starting Mon 1/8/2024, Normal      Junel FE 1/20 1-20 MG-MCG per tablet TAKE 1 TABLET BY MOUTH EVERY DAY, Normal      loratadine (CLARITIN) 10 mg tablet Take 10 mg by mouth as needed  , Historical Med      NIFEdipine (PROCARDIA XL) 30 mg 24 hr tablet Take 2 tablets (60 mg total) by mouth daily, Starting Mon 1/8/2024, Until Wed 2/7/2024, Normal      witch hazel-glycerin (TUCKS) topical pad Apply 1 Pad topically every 4 (four) hours as needed for irritation, Starting Mon 1/8/2024, No Print             No discharge procedures on file.    PDMP Review       None            ED Provider  Electronically Signed by             Nadia Jimenes DO  01/10/24 2037